# Patient Record
Sex: FEMALE | Race: BLACK OR AFRICAN AMERICAN | NOT HISPANIC OR LATINO | Employment: UNEMPLOYED | URBAN - METROPOLITAN AREA
[De-identification: names, ages, dates, MRNs, and addresses within clinical notes are randomized per-mention and may not be internally consistent; named-entity substitution may affect disease eponyms.]

---

## 2017-01-19 ENCOUNTER — HOSPITAL ENCOUNTER (OUTPATIENT)
Facility: HOSPITAL | Age: 37
Discharge: HOME | End: 2017-01-20
Attending: EMERGENCY MEDICINE | Admitting: EMERGENCY MEDICINE
Payer: MEDICAID

## 2017-01-19 DIAGNOSIS — G45.9 TRANSIENT ISCHEMIC ATTACK: ICD-10-CM

## 2017-01-19 PROBLEM — E78.5 HYPERLIPIDEMIA: Status: ACTIVE | Noted: 2017-01-19

## 2017-01-19 PROBLEM — G81.04 FLACCID HEMIPLEGIA AFFECTING LEFT NONDOMINANT SIDE: Status: ACTIVE | Noted: 2017-01-19

## 2017-01-19 LAB
ABO + RH BLD: NORMAL
ALBUMIN SERPL BCP-MCNC: 3.4 G/DL
ALP SERPL-CCNC: 56 U/L
ALT SERPL W/O P-5'-P-CCNC: 19 U/L
AMPHET+METHAMPHET UR QL: NEGATIVE
ANION GAP SERPL CALC-SCNC: 5 MMOL/L
AST SERPL-CCNC: 20 U/L
B-HCG UR QL: NEGATIVE
BARBITURATES UR QL SCN>200 NG/ML: NEGATIVE
BASOPHILS # BLD AUTO: 0.03 K/UL
BASOPHILS NFR BLD: 0.4 %
BENZODIAZ UR QL SCN>200 NG/ML: NEGATIVE
BILIRUB SERPL-MCNC: 0.3 MG/DL
BLD GP AB SCN CELLS X3 SERPL QL: NORMAL
BUN SERPL-MCNC: 8 MG/DL
BZE UR QL SCN: NEGATIVE
CALCIUM SERPL-MCNC: 8.9 MG/DL
CANNABINOIDS UR QL SCN: NEGATIVE
CHLORIDE SERPL-SCNC: 109 MMOL/L
CHOLEST/HDLC SERPL: 2.9 {RATIO}
CO2 SERPL-SCNC: 22 MMOL/L
CREAT SERPL-MCNC: 0.7 MG/DL (ref 0.5–1.4)
CREAT SERPL-MCNC: 0.8 MG/DL
CREAT UR-MCNC: 32 MG/DL
CTP QC/QA: YES
DIFFERENTIAL METHOD: NORMAL
EOSINOPHIL # BLD AUTO: 0.2 K/UL
EOSINOPHIL NFR BLD: 2.3 %
ERYTHROCYTE [DISTWIDTH] IN BLOOD BY AUTOMATED COUNT: 13.1 %
EST. GFR  (AFRICAN AMERICAN): >60 ML/MIN/1.73 M^2
EST. GFR  (NON AFRICAN AMERICAN): >60 ML/MIN/1.73 M^2
ETHANOL SERPL-MCNC: <10 MG/DL
GLUCOSE SERPL-MCNC: 80 MG/DL
GLUCOSE SERPL-MCNC: 81 MG/DL (ref 70–110)
HCG INTACT+B SERPL-ACNC: <1.2 MIU/ML
HCT VFR BLD AUTO: 38.8 %
HDL/CHOLESTEROL RATIO: 34.4 %
HDLC SERPL-MCNC: 247 MG/DL
HDLC SERPL-MCNC: 85 MG/DL
HGB BLD-MCNC: 13.4 G/DL
INR PPP: 1
LDLC SERPL CALC-MCNC: 142 MG/DL
LYMPHOCYTES # BLD AUTO: 1.6 K/UL
LYMPHOCYTES NFR BLD: 20.1 %
MCH RBC QN AUTO: 30.2 PG
MCHC RBC AUTO-ENTMCNC: 34.5 %
MCV RBC AUTO: 87 FL
METHADONE UR QL SCN>300 NG/ML: NEGATIVE
MONOCYTES # BLD AUTO: 0.5 K/UL
MONOCYTES NFR BLD: 6.6 %
NEUTROPHILS # BLD AUTO: 5.4 K/UL
NEUTROPHILS NFR BLD: 70.2 %
NONHDLC SERPL-MCNC: 162 MG/DL
OPIATES UR QL SCN: NEGATIVE
PCP UR QL SCN>25 NG/ML: NEGATIVE
PLATELET # BLD AUTO: 270 K/UL
PMV BLD AUTO: 11.5 FL
POC PTINR: 0.9 (ref 0.9–1.2)
POC PTWBT: 11.3 SEC (ref 9.7–14.3)
POCT GLUCOSE: 81 MG/DL (ref 70–110)
POTASSIUM SERPL-SCNC: 3.9 MMOL/L
PROT SERPL-MCNC: 7.3 G/DL
PROTHROMBIN TIME: 10.3 SEC
RBC # BLD AUTO: 4.44 M/UL
SAMPLE: NORMAL
SAMPLE: NORMAL
SODIUM SERPL-SCNC: 136 MMOL/L
TOXICOLOGY INFORMATION: NORMAL
TRIGL SERPL-MCNC: 100 MG/DL
TSH SERPL DL<=0.005 MIU/L-ACNC: 2.02 UIU/ML
WBC # BLD AUTO: 7.7 K/UL

## 2017-01-19 PROCEDURE — 85610 PROTHROMBIN TIME: CPT

## 2017-01-19 PROCEDURE — 99291 CRITICAL CARE FIRST HOUR: CPT | Mod: ,,, | Performed by: EMERGENCY MEDICINE

## 2017-01-19 PROCEDURE — 93010 ELECTROCARDIOGRAM REPORT: CPT | Mod: ,,, | Performed by: INTERNAL MEDICINE

## 2017-01-19 PROCEDURE — 27000221 HC OXYGEN, UP TO 24 HOURS

## 2017-01-19 PROCEDURE — 82803 BLOOD GASES ANY COMBINATION: CPT

## 2017-01-19 PROCEDURE — 84702 CHORIONIC GONADOTROPIN TEST: CPT

## 2017-01-19 PROCEDURE — 93005 ELECTROCARDIOGRAM TRACING: CPT

## 2017-01-19 PROCEDURE — 80053 COMPREHEN METABOLIC PANEL: CPT

## 2017-01-19 PROCEDURE — 82962 GLUCOSE BLOOD TEST: CPT

## 2017-01-19 PROCEDURE — 84443 ASSAY THYROID STIM HORMONE: CPT

## 2017-01-19 PROCEDURE — 99285 EMERGENCY DEPT VISIT HI MDM: CPT | Mod: 25

## 2017-01-19 PROCEDURE — 80061 LIPID PANEL: CPT

## 2017-01-19 PROCEDURE — 94761 N-INVAS EAR/PLS OXIMETRY MLT: CPT

## 2017-01-19 PROCEDURE — 82570 ASSAY OF URINE CREATININE: CPT

## 2017-01-19 PROCEDURE — 82565 ASSAY OF CREATININE: CPT

## 2017-01-19 PROCEDURE — 85025 COMPLETE CBC W/AUTO DIFF WBC: CPT

## 2017-01-19 PROCEDURE — 86850 RBC ANTIBODY SCREEN: CPT

## 2017-01-19 PROCEDURE — 81025 URINE PREGNANCY TEST: CPT | Performed by: EMERGENCY MEDICINE

## 2017-01-19 PROCEDURE — 25000003 PHARM REV CODE 250: Performed by: PHYSICIAN ASSISTANT

## 2017-01-19 PROCEDURE — G0378 HOSPITAL OBSERVATION PER HR: HCPCS

## 2017-01-19 PROCEDURE — 99223 1ST HOSP IP/OBS HIGH 75: CPT | Mod: ,,, | Performed by: PSYCHIATRY & NEUROLOGY

## 2017-01-19 PROCEDURE — 36592 COLLECT BLOOD FROM PICC: CPT

## 2017-01-19 PROCEDURE — 80320 DRUG SCREEN QUANTALCOHOLS: CPT

## 2017-01-19 PROCEDURE — 86900 BLOOD TYPING SEROLOGIC ABO: CPT

## 2017-01-19 RX ORDER — SODIUM CHLORIDE 0.9 % (FLUSH) 0.9 %
3 SYRINGE (ML) INJECTION EVERY 8 HOURS
Status: DISCONTINUED | OUTPATIENT
Start: 2017-01-19 | End: 2017-01-20 | Stop reason: HOSPADM

## 2017-01-19 RX ORDER — CAFFEINE 200 MG
400 TABLET ORAL EVERY 4 HOURS PRN
COMMUNITY
End: 2017-01-20 | Stop reason: HOSPADM

## 2017-01-19 RX ORDER — ATORVASTATIN CALCIUM 20 MG/1
40 TABLET, FILM COATED ORAL DAILY
Status: DISCONTINUED | OUTPATIENT
Start: 2017-01-19 | End: 2017-01-20 | Stop reason: HOSPADM

## 2017-01-19 RX ORDER — LABETALOL HYDROCHLORIDE 5 MG/ML
10 INJECTION, SOLUTION INTRAVENOUS
Status: DISCONTINUED | OUTPATIENT
Start: 2017-01-19 | End: 2017-01-20 | Stop reason: HOSPADM

## 2017-01-19 RX ADMIN — SODIUM CHLORIDE, PRESERVATIVE FREE 3 ML: 5 INJECTION INTRAVENOUS at 11:01

## 2017-01-19 RX ADMIN — ATORVASTATIN CALCIUM 40 MG: 20 TABLET, FILM COATED ORAL at 11:01

## 2017-01-19 NOTE — Clinical Note
Kary Magaña discharge to home/self care.    - Condition at discharge: Good  - Mode of Discharge: by walking out   - The patient left the ED accompanied by a family member.  - The discharge instructions were discussed with the patient/parent.  - They  state an understanding of the discharge instructions.  - Instructed patient/parent to go to the discharge window.

## 2017-01-19 NOTE — ED AVS SNAPSHOT
[facility]  [address] [id number]               Kary Magaña   2017  8:07 PM   ED    Description:  Female : 1980   Department:  [facility]           Your Care was Coordinated By:     Provider Role From To    John Marroquin MD Attending Provider 17 0819      Reason for Visit     Aphasia           Diagnoses this Visit        Comments    Transient ischemic attack           ED Disposition     ED Disposition Condition Comment    Observation             To Do List           Follow-up Information     Follow up with Primary Doctor No.        Follow up with Sung Barclay - Internal Medicine.    Specialty:  Internal Medicine    Contact information:    [address] [facility] [facility] [id number] [phone]    Additional information:    [facility]       These Medications        Disp Refills Start End    atorvastatin (LIPITOR) 40 MG tablet 90 tablet 3 2017    Take 1 tablet (40 mg total) by mouth once daily. - Oral    Pharmacy: [facility] - [place], [place] - [address] Ph #: [phone]       escitalopram oxalate (LEXAPRO) 10 MG tablet 30 tablet 11 2017    Take 1 tablet (10 mg total) by mouth once daily. - Oral    Pharmacy: [facility] - [place], [place] - [address] Ph #: [phone]         PURCHASE these Medications (No prescription required)        Start End    aspirin 325 MG tablet 2017    Sig - Route: Take 1 tablet (325 mg total) by mouth once daily. - Oral    Class: OTC      [facility]     [facility] -  Assistance  Registered nurses in the [facility] provide clinical advisement, health education, appointment booking, and other advisory services.  Call for this free service at [phone].              Medications           Message regarding Medications     Verify the changes and/or additions to your medication regime listed below are the same as discussed with your clinician today.  If any of these changes or additions are incorrect, please notify your healthcare provider.        START taking these NEW medications        Refills    atorvastatin (LIPITOR) 40 MG tablet 3    Sig: Take 1 tablet (40 mg total) by mouth once daily.    Class: Normal    Route: Oral    aspirin 325 MG tablet 0    Sig: Take 1 tablet (325 mg total) by mouth once daily.    Class: OTC    Route: Oral    escitalopram oxalate (LEXAPRO) 10 MG tablet 11    Sig: Take 1 tablet (10 mg total) by mouth once daily.    Class: Normal    Route: Oral      These medications were administered today        Dose Freq    sodium chloride 0.9% flush 3 mL 3 mL Every 8 hours    Sig: Inject 3 mLs into the vein every 8 (eight) hours.    Class: Normal    Route: Intravenous    Cosign for Ordering: Required by Michelle Russell MD    labetalol injection 10 mg 10 mg Every 15 min PRN    Sig: Inject 2 mLs (10 mg total) into the vein every 15 (fifteen) minutes as needed for SBP greater than or DBP greater than (200).    Class: Normal    Route: Intravenous    Cosign for Ordering: Required by Michelle Russell MD    sodium chloride 0.9% bolus 500 mL 500 mL Continuous PRN    Sig: Inject 500 mLs into the vein continuous prn (PRN SBP < 100).    Class: Normal    Route: Intravenous    Cosign for Ordering: Required by Michelle Russell MD    atorvastatin tablet 40 mg 40 mg Daily    Sig: Take 2 tablets (40 mg total) by mouth once daily.    Class: Normal    Route: Oral    Cosign for Ordering: Required by Michelle Russell MD    aspirin tablet 325 mg 325 mg Daily    Sig: Take 1 tablet (325 mg total) by mouth once daily.    Class: Normal    Route: Oral    gadobutrol 10 mL 10 mL IMG once as needed    Sig: Inject 10 mLs into the vein ONCE PRN for contrast.    Class: Normal    Route:  "Intravenous      STOP taking these medications     caffeine 200 mg Tab Take 400 mg by mouth every 4 (four) hours as needed.           Verify that the below list of medications is an accurate representation of the medications you are currently taking.  If none reported, the list may be blank. If incorrect, please contact your healthcare provider. Carry this list with you in case of emergency.           Current Medications     aspirin 325 MG tablet Take 1 tablet (325 mg total) by mouth once daily.    aspirin tablet 325 mg Take 1 tablet (325 mg total) by mouth once daily.    atorvastatin (LIPITOR) 40 MG tablet Take 1 tablet (40 mg total) by mouth once daily.    atorvastatin tablet 40 mg Take 2 tablets (40 mg total) by mouth once daily.    escitalopram oxalate (LEXAPRO) 10 MG tablet Take 1 tablet (10 mg total) by mouth once daily.    labetalol injection 10 mg Inject 2 mLs (10 mg total) into the vein every 15 (fifteen) minutes as needed for SBP greater than or DBP greater than (200).    sodium chloride 0.9% bolus 500 mL Inject 500 mLs into the vein continuous prn (PRN SBP < 100).    sodium chloride 0.9% flush 3 mL Inject 3 mLs into the vein every 8 (eight) hours.           Clinical Reference Information           Your Vitals Were     BP Pulse Temp Resp Height Weight    127/68 86 98.7 °F (37.1 °C) (Oral) 29 5' 9" (1.753 m) 59 kg (130 lb)    Last Period SpO2 BMI          12/18/2016 94% 19.2 kg/m2        Allergies as of 1/20/2017     No Known Allergies      Immunizations Administered on Date of Encounter - 1/20/2017     None      ED Micro, Lab, POCT     Start Ordered       Status Ordering Provider    01/20/17 0400 01/19/17 2136  Comprehensive metabolic panel  Early Morning Draw     Start Status   01/20/17 0400 Final result   01/21/17 0400 Scheduled       Acknowledged     01/20/17 0400 01/19/17 2136  CBC auto differential  Early Morning Draw     Start Status   01/20/17 0400 Final result   01/21/17 0400 Scheduled       " Acknowledged     01/19/17 2159 01/19/17 2159  ISTAT CREATININE  Once      Final result     01/19/17 2159 01/19/17 2159  ISTAT PROCEDURE  Once      Final result     01/19/17 2133 01/19/17 2136    Once,   Status:  Canceled     Comments:  Fasting    Canceled     01/19/17 2133 01/19/17 2136    Once,   Status:  Canceled      Canceled     01/19/17 2133 01/19/17 2136    Once,   Status:  Canceled      Canceled     01/19/17 2034 01/19/17 2034  POCT glucose  Once      Final result     01/19/17 2008 01/19/17 2007  CBC W/ AUTO DIFFERENTIAL  Once      Final result     01/19/17 2008 01/19/17 2007  Comprehensive metabolic panel  Once      Final result     01/19/17 2008 01/19/17 2007  Protime-INR  Once      Final result     01/19/17 2008 01/19/17 2007  TSH  Once      Final result     01/19/17 2008 01/19/17 2007  POCT glucose  Once      Final result     01/19/17 2008 01/19/17 2007  LDL - Lipid Panel  STAT      Final result     01/19/17 2008 01/19/17 2007  POCT urine pregnancy  Once      Final result     01/19/17 2008 01/19/17 2007  hCG, quantitative, pregnancy  STAT      Final result     01/19/17 2008 01/19/17 2007  Ethanol  STAT      Final result     01/19/17 2008 01/19/17 2007  Drug screen panel, emergency  STAT      Final result       ED Imaging Orders     Start Ordered       Status Ordering Provider    01/20/17 0600 01/20/17 0039  MRA Brain  1 time imaging      In process     01/20/17 0600 01/20/17 0039  MRA Neck  1 time imaging      In process     01/20/17 0037 01/19/17 2136  MRI Brain W WO Contrast  1 time imaging     Comments:  Patient possibly has screw in left ankle    In process     01/19/17 2136 01/19/17 2136    1 time imaging,   Status:  Canceled      Canceled     01/19/17 2135 01/19/17 2136    1 time imaging,   Status:  Canceled     Comments:  Patient possibly has screw in left ankle    Canceled     01/19/17 2135 01/19/17 2136    1 time imaging,   Status:  Canceled      Canceled     01/19/17 2008 01/19/17 2007  CT Head  Without Contrast  1 time imaging      Final result     01/19/17 2008 01/19/17 2007  X-Ray Chest AP Portable  1 time imaging      Final result        [facility] complies with applicable Federal civil rights laws and does not discriminate on the basis of race, color, national origin, age, disability, or sex.        Language Assistance Services     ATTENTION: Language assistance services are available, free of charge. Please call [phone].      ATENCIÓN: Si habla español, tiene a bergman disposición servicios gratuitos de asistencia lingüística. Llame al [phone].     CHÚ Ý: N?u b?n nói Ti?ng Vi?t, có các d?ch v? h? tr? ngôn ng? mi?n phí dành cho b?n. G?i s? [phone].

## 2017-01-20 VITALS
HEIGHT: 69 IN | HEART RATE: 86 BPM | RESPIRATION RATE: 29 BRPM | TEMPERATURE: 99 F | WEIGHT: 130 LBS | SYSTOLIC BLOOD PRESSURE: 127 MMHG | DIASTOLIC BLOOD PRESSURE: 68 MMHG | BODY MASS INDEX: 19.26 KG/M2 | OXYGEN SATURATION: 94 %

## 2017-01-20 PROBLEM — R53.1 LEFT-SIDED WEAKNESS: Status: ACTIVE | Noted: 2017-01-19

## 2017-01-20 LAB
ALBUMIN SERPL BCP-MCNC: 3.1 G/DL
ALP SERPL-CCNC: 55 U/L
ALT SERPL W/O P-5'-P-CCNC: 18 U/L
ANION GAP SERPL CALC-SCNC: 9 MMOL/L
AST SERPL-CCNC: 18 U/L
BASOPHILS # BLD AUTO: 0.02 K/UL
BASOPHILS NFR BLD: 0.2 %
BILIRUB SERPL-MCNC: 0.5 MG/DL
BUN SERPL-MCNC: 6 MG/DL
CALCIUM SERPL-MCNC: 8.8 MG/DL
CHLORIDE SERPL-SCNC: 109 MMOL/L
CO2 SERPL-SCNC: 21 MMOL/L
CREAT SERPL-MCNC: 0.8 MG/DL
DIASTOLIC DYSFUNCTION: NO
DIFFERENTIAL METHOD: ABNORMAL
EOSINOPHIL # BLD AUTO: 0.2 K/UL
EOSINOPHIL NFR BLD: 2.4 %
ERYTHROCYTE [DISTWIDTH] IN BLOOD BY AUTOMATED COUNT: 12.9 %
EST. GFR  (AFRICAN AMERICAN): >60 ML/MIN/1.73 M^2
EST. GFR  (NON AFRICAN AMERICAN): >60 ML/MIN/1.73 M^2
ESTIMATED PA SYSTOLIC PRESSURE: 14.56
GLUCOSE SERPL-MCNC: 98 MG/DL
HCT VFR BLD AUTO: 36.6 %
HGB BLD-MCNC: 13 G/DL
LYMPHOCYTES # BLD AUTO: 2.7 K/UL
LYMPHOCYTES NFR BLD: 31.7 %
MCH RBC QN AUTO: 30.6 PG
MCHC RBC AUTO-ENTMCNC: 35.5 %
MCV RBC AUTO: 86 FL
MONOCYTES # BLD AUTO: 0.6 K/UL
MONOCYTES NFR BLD: 6.6 %
NEUTROPHILS # BLD AUTO: 5 K/UL
NEUTROPHILS NFR BLD: 58.4 %
PLATELET # BLD AUTO: 290 K/UL
PMV BLD AUTO: 11 FL
POTASSIUM SERPL-SCNC: 3.5 MMOL/L
PROT SERPL-MCNC: 6.8 G/DL
RBC # BLD AUTO: 4.25 M/UL
RETIRED EF AND QEF - SEE NOTES: 60 (ref 55–65)
SODIUM SERPL-SCNC: 139 MMOL/L
WBC # BLD AUTO: 8.58 K/UL

## 2017-01-20 PROCEDURE — G8980 MOBILITY D/C STATUS: HCPCS | Mod: CK

## 2017-01-20 PROCEDURE — G8978 MOBILITY CURRENT STATUS: HCPCS | Mod: CK

## 2017-01-20 PROCEDURE — 93306 TTE W/DOPPLER COMPLETE: CPT

## 2017-01-20 PROCEDURE — 93306 TTE W/DOPPLER COMPLETE: CPT | Mod: 26,,, | Performed by: INTERNAL MEDICINE

## 2017-01-20 PROCEDURE — G8996 SWALLOW CURRENT STATUS: HCPCS | Mod: CH

## 2017-01-20 PROCEDURE — G8989 SELF CARE D/C STATUS: HCPCS | Mod: CJ

## 2017-01-20 PROCEDURE — 25500020 PHARM REV CODE 255: Performed by: PSYCHIATRY & NEUROLOGY

## 2017-01-20 PROCEDURE — G0378 HOSPITAL OBSERVATION PER HR: HCPCS

## 2017-01-20 PROCEDURE — 97165 OT EVAL LOW COMPLEX 30 MIN: CPT | Mod: 59

## 2017-01-20 PROCEDURE — 80053 COMPREHEN METABOLIC PANEL: CPT

## 2017-01-20 PROCEDURE — 25000003 PHARM REV CODE 250: Performed by: STUDENT IN AN ORGANIZED HEALTH CARE EDUCATION/TRAINING PROGRAM

## 2017-01-20 PROCEDURE — G8979 MOBILITY GOAL STATUS: HCPCS | Mod: CK

## 2017-01-20 PROCEDURE — 99233 SBSQ HOSP IP/OBS HIGH 50: CPT | Mod: ,,, | Performed by: PSYCHIATRY & NEUROLOGY

## 2017-01-20 PROCEDURE — 92610 EVALUATE SWALLOWING FUNCTION: CPT

## 2017-01-20 PROCEDURE — 97161 PT EVAL LOW COMPLEX 20 MIN: CPT

## 2017-01-20 PROCEDURE — G8988 SELF CARE GOAL STATUS: HCPCS | Mod: CI

## 2017-01-20 PROCEDURE — 25000003 PHARM REV CODE 250: Performed by: PHYSICIAN ASSISTANT

## 2017-01-20 PROCEDURE — A9585 GADOBUTROL INJECTION: HCPCS | Performed by: PSYCHIATRY & NEUROLOGY

## 2017-01-20 PROCEDURE — G8997 SWALLOW GOAL STATUS: HCPCS | Mod: CH

## 2017-01-20 PROCEDURE — G8987 SELF CARE CURRENT STATUS: HCPCS | Mod: CJ

## 2017-01-20 PROCEDURE — 85025 COMPLETE CBC W/AUTO DIFF WBC: CPT

## 2017-01-20 RX ORDER — ASPIRIN 325 MG
325 TABLET ORAL DAILY
Refills: 0 | COMMUNITY
Start: 2017-01-20 | End: 2017-09-04

## 2017-01-20 RX ORDER — ATORVASTATIN CALCIUM 40 MG/1
40 TABLET, FILM COATED ORAL DAILY
Qty: 90 TABLET | Refills: 3 | Status: SHIPPED | OUTPATIENT
Start: 2017-01-20 | End: 2017-09-04

## 2017-01-20 RX ORDER — ASPIRIN 325 MG
325 TABLET ORAL DAILY
Status: DISCONTINUED | OUTPATIENT
Start: 2017-01-20 | End: 2017-01-20 | Stop reason: HOSPADM

## 2017-01-20 RX ORDER — GADOBUTROL 604.72 MG/ML
10 INJECTION INTRAVENOUS
Status: COMPLETED | OUTPATIENT
Start: 2017-01-20 | End: 2017-01-20

## 2017-01-20 RX ORDER — ESCITALOPRAM OXALATE 10 MG/1
10 TABLET ORAL DAILY
Qty: 30 TABLET | Refills: 11 | Status: SHIPPED | OUTPATIENT
Start: 2017-01-20 | End: 2017-11-13 | Stop reason: SDUPTHER

## 2017-01-20 RX ADMIN — GADOBUTROL 10 ML: 604.72 INJECTION INTRAVENOUS at 11:01

## 2017-01-20 RX ADMIN — ASPIRIN 325 MG ORAL TABLET 325 MG: 325 PILL ORAL at 09:01

## 2017-01-20 RX ADMIN — ATORVASTATIN CALCIUM 40 MG: 20 TABLET, FILM COATED ORAL at 09:01

## 2017-01-20 NOTE — ASSESSMENT & PLAN NOTE
Admit to observation for TIA work up, ABCD2 score: 4    Atorvastatin 40mg qhs started, will start asa once scan is complete and no hemorrhage    MRI Brain, MRA head/neck, echo HgA1C pending    OT/ST/PT ordered    Educated patient about OCP and increased risk of stroke

## 2017-01-20 NOTE — PT/OT/SLP EVAL
Physical Therapy  Evaluation    Kary Magaña   MRN: [id number]   Admitting Diagnosis: Left-sided weakness    PT Received On: 17  PT Start Time: 834     PT Stop Time: 905    PT Total Time (min): 31 min       Billable Minutes:  Evaluation 31 min    Diagnosis: Left-sided weakness      Past Medical History   Diagnosis Date    Sickle cell trait       History reviewed. No pertinent past surgical history.    Referring physician: Michelle Russell MD  Date referred to PT: 17    General Precautions: Standard, aspiration, fall  Orthopedic Precautions: N/A   Braces: N/A       Patient History:  Lives With: child(rachell), dependent (7 children ranging from 2-17 years old)  Living Arrangements: house  Home Accessibility: stairs to enter home, stairs within home  Home Layout: Bedroom on 2nd floor  Number of Stairs to Enter Home: 1  Number of Stairs Within Home: 7  Stair Railings at Home: inside, present at both sides  Living Environment Comment:  (Pt does not work, spends all of her time caring for her 7 children.)  Equipment Currently Used at Home: none  DME owned (not currently used): none    Previous Level of Function:  Ambulation Skills: independent  Transfer Skills: independent  ADL Skills: independent  Work/Leisure Activity: independent    Subjective:  Communicated with RN prior to session.  Pt mother present during session.  Pt reports that she is feeling better, but she is not back to normal  Chief Complaint: L sided weakness, heaviness  Patient goals: To get back home to take care of her children.    Pain Ratin/10   Pain Rating Post-Intervention: 0/10    Objective:   Patient found with: blood pressure cuff, peripheral IV, telemetry     Cognitive Exam:  Oriented to: Person, Place, Time and Situation    Follows Commands/attention: Follows multistep  commands  Communication: clear/fluent  Safety awareness/insight to disability: intact    Physical Exam:  Postural examination/scapula alignment: No postural  abnormalities identified    Skin integrity: Visible skin intact  Edema: None noted     Sensation:   Impaired light/touch L UE    Lower Extremity Range of Motion:  Right Lower Extremity: WFL  Left Lower Extremity: WFL    Lower Extremity Strength:  Right Lower Extremity: WFL  Left Lower Extremity: Deficits: grossly 4/5, knee flexion 4+/5     Fine motor coordination:  Impaired : Left hand, finger to nose -Past pointing and increased difficulty, LLE heel shin -Increased difficulty    Functional Mobility:  Bed Mobility:  Rolling/Turning Right: Modified independent  Scooting/Bridging: Modified Independent  Supine to Sit: Modified Independent (Pt sat up into long sitting and then swung her legs over R side of stretcher.)  Sit to Supine: Supervision (Pt required VC to make sure she was safely sitting on stretcher before attempting to lay down.)    Transfers:  Sit <> Stand Assistance: Contact Guard Assistance  Sit <> Stand Assistive Device: No Assistive Device    Gait:   Gait Distance: 50 ft  Assistance 1: Contact Guard Assistance, Minimum assistance  Gait Assistive Device: Hand held assist (L hand )  Gait Pattern: partial weightbearing, step-to  Gait Deviation(s): decreased weight-shifting ability, decreased R step length, L lateral lean, lacking L terminal knee extension    Tinetti Gait and Balance Assessment     Assistive Device  Normally Used: No  Assistive Device During Testing: Yes (HHA)      Balance Tests:  1. Sitting Balance:          Steady; safe = 1  2. Arises :        Able, uses arms to help = 1  3. Attempts to arise :        Able to arise, 1 attempt = 2  4. Immediate standing Balance :        Unsteady (swaggers, moves feet, trunk sway) = 0  5. Standing balance:         Unsteady = 0  6. Nudged :        Did not attempt for safety = 0  7. Eyes closed:         Unsteady = 0  8. Turning 360 degrees:         Did not attempt for safety = 0  9.Sitting Down :         Uses arms or not a smooth motion = 1       Balance Score   5/16    Gait Tests:  10. Initiation of Gait:         Any hesitancy or multiple attempts to start = 0  11. Step length and height :        Right swing foot:  Does not pass left stance foot with step = 0 and Does not clear foot completely with step = 0 and Left swing foot:  Passes right stance foot = 0 and Completely clears floor with step = 1  12. Step symmetry         Right & Left step length not equal (estimate) = 0  13. Step continuity :        Stooping or discontinuity between steps = 0  14. Path :        Mild/moderate deviation or uses walking aid = 1  15. Trunk :          No sway, but flexion of knees or back or spreads arm out while walking = 1  16. Walking stance width          Heels apart = 0       Gait Score:    3/12     Balance + Gait Score: 8 /28       25-28= Low fall risk  19-24 Medium fall risk  < 19= High fall risk    AM-PAC 6 CLICK MOBILITY  How much help from another person does this patient currently need?   1 = Unable, Total/Dependent Assistance  2 = A lot, Maximum/Moderate Assistance  3 = A little, Minimum/Contact Guard/Supervision  4 = None, Modified [place]/Independent    Turning over in bed (including adjusting bedclothes, sheets and blankets)?: 3  Sitting down on and standing up from a chair with arms (e.g., wheelchair, bedside commode, etc.): 3  Moving from lying on back to sitting on the side of the bed?: 3  Moving to and from a bed to a chair (including a wheelchair)?: 3  Need to walk in hospital room?: 3  Climbing 3-5 steps with a railing?: 3  Total Score: 18     AM-PAC Raw Score CMS G-Code Modifier Level of Impairment Assistance   6 % Total / Unable   7 - 9 CM 80 - 100% Maximal Assist   10 - 14 CL 60 - 80% Moderate Assist   15 - 19 CK 40 - 60% Moderate Assist   20 - 22 CJ 20 - 40% Minimal Assist   23 CI 1-20% SBA / CGA   24 CH 0% Independent/ Mod I     Patient left HOB elevated with all lines intact, call button in reach and mother and speech present.    Assessment:    Kary Magaña is a 36 y.o. female with a medical diagnosis of Left-sided weakness and presents with impaired strength, impaired sensation, impaired endurance, decreased balance, and impaired coordination.  Ms. Magaña exhibits slight strength and sensation deficits with her L LE and she is able to complete bed mobility tasks without any physical assistance.  Upon standing and during ambulation, Ms. Magaña exhibits severe balance and coordination deficits (Tinetti Balance Assessment Score: 8/28) and is at risk of falls. She would benefit from skilled PT services for strengthening, endurance training, and NMR/balance training in order to improve her independence and safety with mobility and allow her to return home to care for her 7 children.     Rehab identified problem list/impairments: Rehab identified problem list/impairments: weakness, impaired endurance, impaired sensation, gait instability, impaired functional mobilty, impaired balance, impaired coordination, decreased lower extremity function    Rehab potential is good.    Activity tolerance: Good    Discharge recommendations: Discharge Facility/Level Of Care Needs: rehabilitation facility     Barriers to discharge: Barriers to Discharge: Inaccessible home environment, Decreased caregiver support    Equipment recommendations: Equipment Needed After Discharge: shower chair, cane, straight, walker, rolling     GOALS:   Physical Therapy Goals     Not on file      Multidisciplinary Problems (Resolved)        Problem: Physical Therapy Goal    Goal Priority Disciplines Outcome Goal Variances Interventions   Physical Therapy Goal   (Resolved)     PT/OT, PT Outcome(s) achieved     Description:  Goals to be met by: 2017    Patient will increase functional independence with mobility by performin. Sit to stand transfer with Standby Assist with Rolling Walker  2. Gait  x 150 feet with Stand-by Assistance using Rolling Walker.   3. Ascend/descend 7 stair with  bilateral Handrails Stand-by Assistance.  4. Stand for 10 minutes with Contact Guard Assistance with moderate challenges to balance with no AD.  5. Lower extremity exercise program x15 reps per handout, with supervision.                PLAN:    Patient to be seen 6 x/week to address the above listed problems via gait training, therapeutic activities, therapeutic exercises, neuromuscular re-education  Plan of Care expires: 02/20/17  Plan of Care reviewed with: patient, mother          Rodri Beyerwhitneytri, SPT  01/20/2017

## 2017-01-20 NOTE — PROGRESS NOTES
[facility]  Vascular Neurology  Comprehensive Stroke Center  Progress Note      Neurologic Chief Complaint: L sided weakness    Subjective:     Interval History: Patient is seen for follow-up neurological assessment and treatment recommendations: Continued, subjective L sided weakness and numbness.  MRI pending.    HPI, Past Medical, Family, and Social History remains the same as documented in the initial encounter.     Review of Systems   Constitutional: Negative for chills and fever.   HENT: Negative for drooling and rhinorrhea.    Eyes: Negative for discharge and visual disturbance.   Respiratory: Negative for cough and shortness of breath.    Cardiovascular: Negative for chest pain and palpitations.   Gastrointestinal: Negative for diarrhea and vomiting.   Genitourinary: Negative for dysuria and urgency.   Musculoskeletal: Negative for arthralgias and back pain.   Skin: Negative for pallor and rash.   Neurological: Positive for weakness and numbness.   Hematological: Does not bruise/bleed easily.   Psychiatric/Behavioral: Negative for agitation.     Scheduled Meds:   aspirin  325 mg Oral Daily    atorvastatin  40 mg Oral Daily    sodium chloride 0.9%  3 mL Intravenous Q8H     Continuous Infusions:   sodium chloride 0.9%       PRN Meds:labetalol, sodium chloride 0.9%    Objective:     Vital Signs (Most Recent):  Temp: 98.7 °F (37.1 °C) (01/19/17 2001)  Pulse: 86 (01/20/17 0959)  Resp: (!) 29 (01/20/17 0959)  BP: 127/68 (01/20/17 0959)  SpO2: (!) 94 % (01/20/17 0959)       Vital Signs Range (Last 24H):  Temp:  [98.7 °F (37.1 °C)]   Pulse:  []   Resp:  [18-29]   BP: ()/(55-94)   SpO2:  [92 %-100 %]        Physical Exam   Constitutional: She is oriented to person, place, and time. She appears well-developed and well-nourished. No distress.   HENT:   Head: Atraumatic.   Eyes: EOM are normal.   Neck: Normal range of motion.   Cardiovascular: Normal rate.    Pulmonary/Chest: Effort  normal.   Musculoskeletal: Normal range of motion.   Neurological: She is alert and oriented to person, place, and time.   Skin: Skin is warm and dry. She is not diaphoretic.   Psychiatric: She has a normal mood and affect.   Nursing note and vitals reviewed.      Neurological Exam:   LOC: alert and follows requests  Language: No aphasia  Speech: No dysarthria  Orientation: Person, Place, Time  Memory: Recent memory intact, Remote memory intact, Age correct, Month correct  Visual Fields (CN II): Full  EOM (CN III, IV, VI): Full/intact  Facial Movement (CN VII): symmetric facial expressiond  Tongue (CN XII): midline   Motor*: Arm Left: Paretic: 4+/5, Leg Left: Paretic: 4+/5, Arm Right: Normal (5/5), Leg Right: Normal (5/5)  Cerebellar: normal finger to nose bilaterally.  Mild ataxia of LLE with heel to shin  Sensation: decreased on L  Tone: Arm-Left: normal; Leg-Left: normal; Arm-Right: normal; Leg-Right: normal    NIH Stroke Scale:  Interval: 24 hours post onset of symptoms +/- 20 mins  Level of Consciousness: 0 - alert  LOC Questions: 0 - answers both correctly  LOC Commands: 0 - performs both correctly  Best Gaze: 0 - normal  Visual: 0 - no visual loss  Facial Palsy: 0 - normal  Motor Left Arm: 0 - no drift  Motor Right Arm: 0 - no drift  Motor Left Le - no drift  Motor Right Le - no drift  Limb Ataxia: 1 - present in one limb  Sensory: 1 - mild to moderate loss  Best Language: 0 - no aphasia  Dysarthria: 0 - normal articulation  Extinction and Inattention: 0 - no neglect  NIH Stroke Scale Total: 2      Laboratory:  CMP:   Recent Labs  Lab 17   CALCIUM 8.8   ALBUMIN 3.1*   PROT 6.8      K 3.5   CO2 21*      BUN 6   CREATININE 0.8   ALKPHOS 55   ALT 18   AST 18   BILITOT 0.5     BMP:   Recent Labs  Lab 17      K 3.5      CO2 21*   BUN 6   CREATININE 0.8   CALCIUM 8.8     CBC:   Recent Labs  Lab 17   WBC 8.58   RBC 4.25   HGB 13.0   HCT 36.6*   PLT  290   MCV 86   MCH 30.6   MCHC 35.5     Lipid Panel:   Recent Labs  Lab 01/19/17 2029   CHOL 247*   LDLCALC 142.0   HDL 85*   TRIG 100     Coagulation:   Recent Labs  Lab 01/19/17 2029   INR 1.0     Hgb A1C: No results for input(s): HGBA1C in the last 168 hours.  TSH:   Recent Labs  Lab 01/19/17 2029   TSH 2.020       Diagnostic Results:  I have personally reviewed: CT Head. Date: 12/19/17  Findings: no acute abnormalities    MRI/A pending    Assessment/Plan:     Patient is a 36 year old female with no known PMH who presented to the ED with resolving left sided weakness.  Mild decrease in strength on left side when comparing to right.      Left-sided weakness  ASA 325mg daily  Atorvastatin 40mg daily    MRI Brain, MRA head/neck, TTE, HgA1C pending    OT/ST/PT ordered    Educated patient about OCP and increased risk of stroke.  Recommend decreasing/stopping caffeine use.    Flaccid hemiplegia affecting left nondominant side  PT/OT    Hyperlipidemia  Risk factor for stroke    Atorvastatin 40mg qhs       Roxann Leung MD  Comprehensive Stroke Center  Department of Vascular Neurology   [facility]

## 2017-01-20 NOTE — PLAN OF CARE
Problem: Occupational Therapy Goal  Goal: Occupational Therapy Goal  Goals not set 2* patient scheduled to d/c home.  OT evaluation completed.  Recommended outpt OT to address left sided weakness.  DALY Olivarez  1/20/2017

## 2017-01-20 NOTE — H&P
[facility]  Vascular Neurology  [facility] St[facility] Center  History & Physical    Subjective:     History of Present Illness:  Patient is a 36 year old female with no known PMH who presented to the ED with resolving left sided weakness.  Patient said she experienced SOB and noticed her face was crooked.  She has slurred speech and her was unable to move left arm.  She didn't have a headache at the time and when arrived to ED she is almost back to baseline.  She denies history of tobacco abuse, alcohol use, drug use, miscarriages and clotting disorders.      NIH stroke scale: 0            Past Medical History   Diagnosis Date    Sickle cell trait      History reviewed. No pertinent past surgical history.  History reviewed. No pertinent family history.  Social History   Substance Use Topics    Smoking status: Never Smoker    Smokeless tobacco: None    Alcohol use Yes      Comment: socially     Review of patient's allergies indicates:  No Known Allergies  Medications: I have reviewed the current medication administration record.      (Not in a hospital admission)    Review of Systems   Constitutional: Negative for chills and fever.   HENT: Negative for drooling and rhinorrhea.    Eyes: Negative for discharge and visual disturbance.   Respiratory: Negative for cough and shortness of breath.    Cardiovascular: Negative for chest pain and palpitations.   Gastrointestinal: Negative for diarrhea and vomiting.   Genitourinary: Negative for dysuria and urgency.   Musculoskeletal: Negative for arthralgias and back pain.   Skin: Negative for pallor and rash.   Neurological: Negative for dizziness and facial asymmetry.   Hematological: Does not bruise/bleed easily.   Psychiatric/Behavioral: Negative for agitation.     Objective:     Vital Signs (Most Recent):  Temp: 98.7 °F (37.1 °C) (01/19/17 2001)  Pulse: 104 (01/19/17 2102)  Resp: 20 (01/19/17 2102)  BP: 138/86 (01/19/17 2102)  SpO2: 100 % (01/19/17  2)    Vital Signs Range (Last 24H):  Temp:  [98.7 °F (37.1 °C)]   Pulse:  [101-107]   Resp:  [18-20]   BP: (138-158)/(84-94)   SpO2:  [100 %]     Physical Exam   Constitutional: She appears well-developed.   HENT:   Head: Normocephalic and atraumatic.   Eyes: EOM are normal. Pupils are equal, round, and reactive to light.   Neck: Neck supple.   Cardiovascular:   tachycardic   Pulmonary/Chest: Effort normal. No respiratory distress.   Abdominal:   Not examined    Genitourinary:   Genitourinary Comments: Not examined   Musculoskeletal: Normal range of motion.   Neurological:   See below     Skin: No rash noted. No erythema.   Psychiatric: She has a normal mood and affect.       Neurological Exam:   LOC: alert and follows requests  Language: No aphasia  Speech: No dysarthria  Orientation: Person, Place, Time  Memory: Recent memory intact, Remote memory intact, Age correct, Month correct  Visual Fields (CN II): Full  EOM (CN III, IV, VI): Full/intact  Oculocephalics: not examined  Pupils (CN III, IV, VI): PERRL  Facial Sensation (CN V): Symmetric  Facial Movement (CN VII): symmetric facial expression  Hearing (CN VIII): not done  Gag Reflex (CN IX, X): not done  Shoulder/Neck (CN XI): not exmained  Tongue (CN XII): midline   Reflexes: not examined   Motor*: Arm Left:  Paretic:  4/5, Leg Left:   Paretic:  4/5, Arm Right:   Normal (5/5), Leg Right:   Normal (5/5)  Cerebellar*: normal finger to nose  Sensation:intact   Tone: Arm-Left: normal; Leg-Left: normal; Arm-Right: normal; Leg-Right: normal    NIH Stroke Scale:  Interval: baseline (upon arrival/admit)  Level of Consciousness: 0 - alert  LOC Questions: 0 - answers both correctly  LOC Commands: 0 - performs both correctly  Best Gaze: 0 - normal  Visual: 0 - no visual loss  Facial Palsy: 0 - normal  Motor Left Arm: 0 - no drift  Motor Right Arm: 0 - no drift  Motor Left Le - no drift  Motor Right Le - no drift  Limb Ataxia: 0 - absent  Sensory: 0 -  normal  Best Language: 0 - no aphasia  Dysarthria: 0 - normal articulation  Extinction and Inattention: 0 - no neglect  NIH Stroke Scale Total: 0  Modified [place] Scale:   Timeline: Prior to symptoms onset  Modified [place] Score: 0 - no symptoms    ABCD2 Scale for TIA:   Age > or = 60: 0 - no  B/P or = 140/9 at Initial Evaluation: 1 - yes  Clinical Features of TIA: 2 - unilateral weakness  Duration of Symptoms: 1 - 10-59 minutes  Diabetes Mellitus in History: 0 - no  ABCD2 Scale Total: 4      Laboratory:  CMP:   Recent Labs  Lab 01/19/17 2029   CALCIUM 8.9   ALBUMIN 3.4*   PROT 7.3      K 3.9   CO2 22*      BUN 8   CREATININE 0.8   ALKPHOS 56   ALT 19   AST 20   BILITOT 0.3     CBC:   Recent Labs  Lab 01/19/17 2029   WBC 7.70   RBC 4.44   HGB 13.4   HCT 38.8      MCV 87   MCH 30.2   MCHC 34.5     Lipid Panel:   Recent Labs  Lab 01/19/17 2029   CHOL 247*   LDLCALC 142.0   HDL 85*   TRIG 100     Hgb A1C: No results for input(s): HGBA1C in the last 168 hours.  TSH:   Recent Labs  Lab 01/19/17 2029   TSH 2.020       Diagnostic Results:  Brain Imaging:  No studies to review     Assessment/Plan:   Patient is a 36 year old female with no known PMH who presented to the ED with resolving left sided weakness.      Transient ischemic attack  Admit to observation for TIA work up, ABCD2 score: 4    Atorvastatin 40mg qhs started, will start asa once scan is complete and no hemorrhage    MRI Brain, MRA head/neck, echo HgA1C pending    OT/ST/PT ordered    Educated patient about OCP and increased risk of stroke        Flaccid hemiplegia affecting left nondominant side  PT/OT ordered     Hyperlipidemia  Risk factor for stroke    Started atorvastatin 40mg qhs       Thrombolysis Candidate? Interventional Revascularization Candidate?  No symptoms resolving     Research Candidate? no    Amalia Briseno PA-C  [facility]  Department of Vascular Neurology   [facility]

## 2017-01-20 NOTE — SUBJECTIVE & OBJECTIVE
Neurologic Chief Complaint: L sided weakness    Subjective:     Interval History: Patient is seen for follow-up neurological assessment and treatment recommendations: Continued, subjective L sided weakness and numbness.  MRI pending.    HPI, Past Medical, Family, and Social History remains the same as documented in the initial encounter.     Review of Systems   Constitutional: Negative for chills and fever.   HENT: Negative for drooling and rhinorrhea.    Eyes: Negative for discharge and visual disturbance.   Respiratory: Negative for cough and shortness of breath.    Cardiovascular: Negative for chest pain and palpitations.   Gastrointestinal: Negative for diarrhea and vomiting.   Genitourinary: Negative for dysuria and urgency.   Musculoskeletal: Negative for arthralgias and back pain.   Skin: Negative for pallor and rash.   Neurological: Positive for weakness and numbness.   Hematological: Does not bruise/bleed easily.   Psychiatric/Behavioral: Negative for agitation.     Scheduled Meds:   aspirin  325 mg Oral Daily    atorvastatin  40 mg Oral Daily    sodium chloride 0.9%  3 mL Intravenous Q8H     Continuous Infusions:   sodium chloride 0.9%       PRN Meds:labetalol, sodium chloride 0.9%    Objective:     Vital Signs (Most Recent):  Temp: 98.7 °F (37.1 °C) (01/19/17 2001)  Pulse: 86 (01/20/17 0959)  Resp: (!) 29 (01/20/17 0959)  BP: 127/68 (01/20/17 0959)  SpO2: (!) 94 % (01/20/17 0959)       Vital Signs Range (Last 24H):  Temp:  [98.7 °F (37.1 °C)]   Pulse:  []   Resp:  [18-29]   BP: ()/(55-94)   SpO2:  [92 %-100 %]        Physical Exam   Constitutional: She is oriented to person, place, and time. She appears well-developed and well-nourished. No distress.   HENT:   Head: Atraumatic.   Eyes: EOM are normal.   Neck: Normal range of motion.   Cardiovascular: Normal rate.    Pulmonary/Chest: Effort normal.   Musculoskeletal: Normal range of motion.   Neurological: She is alert and oriented to  person, place, and time.   Skin: Skin is warm and dry. She is not diaphoretic.   Psychiatric: She has a normal mood and affect.   Nursing note and vitals reviewed.      Neurological Exam:   LOC: alert and follows requests  Language: No aphasia  Speech: No dysarthria  Orientation: Person, Place, Time  Memory: Recent memory intact, Remote memory intact, Age correct, Month correct  Visual Fields (CN II): Full  EOM (CN III, IV, VI): Full/intact  Facial Movement (CN VII): symmetric facial expressiond  Tongue (CN XII): midline   Motor*: Arm Left: Paretic: 4+/5, Leg Left: Paretic: 4+/5, Arm Right: Normal (5/5), Leg Right: Normal (5/5)  Cerebellar: normal finger to nose bilaterally.  Mild ataxia of LLE with heel to shin  Sensation: decreased on L  Tone: Arm-Left: normal; Leg-Left: normal; Arm-Right: normal; Leg-Right: normal    NIH Stroke Scale:  Interval: 24 hours post onset of symptoms +/- 20 mins  Level of Consciousness: 0 - alert  LOC Questions: 0 - answers both correctly  LOC Commands: 0 - performs both correctly  Best Gaze: 0 - normal  Visual: 0 - no visual loss  Facial Palsy: 0 - normal  Motor Left Arm: 0 - no drift  Motor Right Arm: 0 - no drift  Motor Left Le - no drift  Motor Right Le - no drift  Limb Ataxia: 1 - present in one limb  Sensory: 1 - mild to moderate loss  Best Language: 0 - no aphasia  Dysarthria: 0 - normal articulation  Extinction and Inattention: 0 - no neglect  NIH Stroke Scale Total: 2      Laboratory:  CMP:   Recent Labs  Lab 17   CALCIUM 8.8   ALBUMIN 3.1*   PROT 6.8      K 3.5   CO2 21*      BUN 6   CREATININE 0.8   ALKPHOS 55   ALT 18   AST 18   BILITOT 0.5     BMP:   Recent Labs  Lab 17      K 3.5      CO2 21*   BUN 6   CREATININE 0.8   CALCIUM 8.8     CBC:   Recent Labs  Lab 17   WBC 8.58   RBC 4.25   HGB 13.0   HCT 36.6*      MCV 86   MCH 30.6   MCHC 35.5     Lipid Panel:   Recent Labs  Lab 17   CHOL  247*   LDLCALC 142.0   HDL 85*   TRIG 100     Coagulation:   Recent Labs  Lab 01/19/17 2029   INR 1.0     Hgb A1C: No results for input(s): HGBA1C in the last 168 hours.  TSH:   Recent Labs  Lab 01/19/17 2029   TSH 2.020       Diagnostic Results:  I have personally reviewed: CT Head. Date: 12/19/17  Findings: no acute abnormalities    MRI/A pending

## 2017-01-20 NOTE — PLAN OF CARE
Problem: Physical Therapy Goal  Goal: Physical Therapy Goal  Goals to be met by: 2017    Patient will increase functional independence with mobility by performin. Sit to stand transfer with Standby Assist with Rolling Walker  2. Gait x 150 feet with Stand-by Assistance using Rolling Walker.   3. Ascend/descend 7 stair with bilateral Handrails Stand-by Assistance.  4. Stand for 10 minutes with Contact Guard Assistance with moderate challenges to balance with no AD.  5. Lower extremity exercise program x15 reps per handout, with supervision.   Outcome: Outcome(s) achieved Date Met:  17  No goals met secondary to pt D/C home.     Rodri Finney, SPT  2017

## 2017-01-20 NOTE — PLAN OF CARE
Problem: SLP Goal  Goal: SLP Goal  Outcome: Ongoing (interventions implemented as appropriate)  Regular diet with thin liquids recommended.    Apple Stephen MA/MALACHI-SLP  Speech Language Pathologist  Pager [phone]  1/20/2017

## 2017-01-20 NOTE — PT/OT/SLP EVAL
"Speech Language Pathology  Evaluation    Kary Magaña   MRN: [id number]   Admitting Diagnosis: <principal problem not specified>    Diet recommendations: Solid Diet Level: Regular  Liquid Diet Level: Thin     SLP Treatment Date: 17  Speech Start Time: 855     Speech Stop Time: 905     Speech Total (min): 10 min       TREATMENT BILLABLE MINUTES:  Eval Swallow and Oral Function 10    Diagnosis: <principal problem not specified>      Past Medical History   Diagnosis Date    Sickle cell trait      History reviewed. No pertinent past surgical history.    Has the patient been evaluated by SLP for swallowing? : Yes  Keep patient NPO?: No   General Precautions: Standard,              Prior diet: Regular diet with thin  Subjective:  "I am hungry"    Pain Ratin/10     Pain Rating Post-Intervention: 0/10    Objective:        Oral Musculature Evaluation  Oral Musculature: WNL  Dentition: present and adequate  Mucosal Quality: good  Mandibular Strength and Mobility: WNL  Oral Labial Strength and Mobility: WNL  Lingual Strength and Mobility: WNL  Velar Elevation: WNL  Buccal Strength and Mobility: WNL  Volitional Cough: strong  Volitional Swallow: no delay  Voice Prior to PO Intake: wnl     Bedside Swallow Eval:  Consistencies Assessed: Thin liquids 1 cup, Puree 2 teaspoons and Solids 1/2 cracker  Oral Phase: WFL  Pharyngeal Phase: no overt clinical  signs/symptoms of aspiration and no overt clinical signs/symptoms of pharyngeal dysphagia    Additional Treatment:      FIM:                                 Assessment:  Kary Magaña is a 36 y.o. female with a medical diagnosis of <principal problem not specified> and presents with oral and pharyngeal phases of swallow wfl.    Do you have any cultural, spiritual, Scientologist conflicts, given your current situation?: no     Discharge recommendations: Discharge Facility/Level Of Care Needs: home     Goals:   SLP Goals        Problem: SLP Goal    Goal Priority Disciplines " Outcome   SLP Goal     SLP Ongoing (interventions implemented as appropriate)   Description:  Pt. Will participate in speech language eval               Plan:   Patient to be seen Therapy Frequency: 5 x/week   Plan of Care expires: 02/18/17  Plan of Care reviewed with: patient  SLP Follow-up?: Yes  SLP - Next Visit Date: 01/23/17           Apple Stephen MA, CCC-SLP  01/20/2017

## 2017-01-20 NOTE — ASSESSMENT & PLAN NOTE
ASA 325mg daily  Atorvastatin 40mg daily    MRI Brain, MRA head/neck, TTE, HgA1C pending    OT/ST/PT ordered    Educated patient about OCP and increased risk of stroke.  Recommend decreasing/stopping caffeine use.

## 2017-01-20 NOTE — ED PROVIDER NOTES
Encounter Date: 1/19/2017    SCRIBE #1 NOTE: I, Marcia Ferrari , am scribing for, and in the presence of,  Dr. Marroquin . I have scribed the entire note.       History     Chief Complaint   Patient presents with    Aphasia     Pt presents to the ED via EMS from home. EMS reports sudden onset of left sided weakness, slurred speech and facial droop that began around 6:30pm.      Review of patient's allergies indicates:  No Known Allergies  HPI Comments: Time seen by provider: 8:00 PM    This is a 36 y.o. female with no pertinent medical hx who presents to the ED via EMS from home. EMS reports sudden onset of left sided weakness, slurred speech, and slight left sided facial droop that began around 6:30 PM. EMS states that the slurred speech is the most prominent. Per EMS, patient denies headache, nausea, vomiting, and diarrhea.     The history is provided by the EMS personnel and medical records. The history is limited by the condition of the patient.     Past Medical History   Diagnosis Date    Sickle cell trait      Past Medical History Pertinent Negatives   Diagnosis Date Noted    Anticoagulant long-term use 1/19/2017    Arthritis 1/19/2017     History reviewed. No pertinent past surgical history.  History reviewed. No pertinent family history.  Social History   Substance Use Topics    Smoking status: Never Smoker    Smokeless tobacco: None    Alcohol use Yes      Comment: socially     Review of Systems   Unable to perform ROS: Acuity of condition       Physical Exam   Initial Vitals   BP Pulse Resp Temp SpO2   01/19/17 2001 01/19/17 2001 01/19/17 2001 01/19/17 2001 01/19/17 2001   158/94 107 18 98.7 °F (37.1 °C) 100 %     Physical Exam    Nursing note and vitals reviewed.  Constitutional: She appears well-developed and well-nourished. She is not diaphoretic. No distress.   HENT:   Head: Normocephalic and atraumatic.   Mouth/Throat: Oropharynx is clear and moist.   In her PMHx, no headaches    Eyes: Conjunctivae  and EOM are normal. Pupils are equal, round, and reactive to light.   Neck: Normal range of motion. Neck supple. No JVD present.   Cardiovascular: Normal rate, regular rhythm and normal heart sounds.   No murmur heard.  Pulmonary/Chest: Breath sounds normal. No respiratory distress. She has no wheezes. She has no rhonchi. She has no rales.   Abdominal: Soft. Bowel sounds are normal. She exhibits no mass. There is no tenderness. There is no rebound and no guarding.   Musculoskeletal: She exhibits no edema or tenderness.   Subtle weakness to left arm and left leg.    Neurological: She is alert and oriented to person, place, and time. She has normal strength. No cranial nerve deficit or sensory deficit.   Skin: Skin is warm and dry. No rash noted.   Psychiatric: She has a normal mood and affect.         ED Course   Procedures  Labs Reviewed   COMPREHENSIVE METABOLIC PANEL - Abnormal; Notable for the following:        Result Value    CO2 22 (*)     Albumin 3.4 (*)     Anion Gap 5 (*)     All other components within normal limits   LIPID PANEL - Abnormal; Notable for the following:     Cholesterol 247 (*)     HDL 85 (*)     All other components within normal limits   COMPREHENSIVE METABOLIC PANEL - Abnormal; Notable for the following:     CO2 21 (*)     Albumin 3.1 (*)     All other components within normal limits   CBC W/ AUTO DIFFERENTIAL - Abnormal; Notable for the following:     Hematocrit 36.6 (*)     All other components within normal limits   CBC W/ AUTO DIFFERENTIAL   PROTIME-INR   TSH   HCG, QUANTITATIVE, PREGNANCY   ALCOHOL,MEDICAL (ETHANOL)   DRUG SCREEN PANEL, URINE EMERGENCY   POCT GLUCOSE   POCT URINE PREGNANCY   TYPE & SCREEN   POCT GLUCOSE   ISTAT PROCEDURE   ISTAT CREATININE     EKG Readings: (Independently Interpreted)   Normal sinus rhythm at 90. No ischemic changes        X-Rays:   Independently Interpreted Readings:   Head CT: No acute findings.     Medical Decision Making:   History:   Old Medical  Records: I decided to obtain old medical records.  Differential Diagnosis:   Discussed with stroke neurology they will evaluate.   Independently Interpreted Test(s):   I have ordered and independently interpreted EKG Reading(s) - see prior notes  Clinical Tests:   Lab Tests: Ordered and Reviewed  Radiological Study: Ordered and Reviewed  Medical Tests: Ordered and Reviewed            Scribe Attestation:   Scribe #1: I performed the above scribed service and the documentation accurately describes the services I performed. I attest to the accuracy of the note.    Attending Attestation:         Attending Critical Care:   Critical Care Times:   Direct Patient Care (initial evaluation, reassessments, and time considering the case)................................................................22 minutes.   Additional History from reviewing old medical records or taking additional history from the family, EMS, PCP, etc.......................3 minutes.   Ordering, Reviewing, and Interpreting Diagnostic Studies...............................................................................................................3 minutes.   Documentation..................................................................................................................................................................................3 minutes.   Consultation with other Physicians. .................................................................................................................................................3 minutes.   ==============================================================  · Total Critical Care Time - exclusive of procedural time: 34 minutes.  ==============================================================    Physician Attestation for Scribe:  Physician Attestation Statement for Scribe #1: I, Dr. Marroquin, reviewed documentation, as scribed by Marcia Ferrari in my presence, and it is both accurate and complete.                  ED Course     Clinical Impression:   The encounter diagnosis was Transient ischemic attack.    Disposition:   Disposition: Placed in Observation  Condition: Stable       John Marroquin MD  01/20/17 1017

## 2017-01-20 NOTE — ED TRIAGE NOTES
"Kary Magaña, a 36 y.o. female presents to the ED c/o left sided weakness that started at 630 pm. Pt states she was having blurred vision and slurred speech at time of onset but is not currently having blurred vision, difficulty speaking, slurred speech or HA. Pt reports left sided feeling "heavier" at this time.      Chief Complaint   Patient presents with    Aphasia     Pt presents to the ED via EMS from home. EMS reports sudden onset of left sided weakness, slurred speech and facial droop that began around 6:30pm.      Review of patient's allergies indicates:  No Known Allergies  No past medical history on file.   "

## 2017-01-20 NOTE — PT/OT/SLP EVAL
"Occupational Therapy  Evaluation/ Discharge Summary    Kary Magaña   MRN: [id number]   Admitting Diagnosis: Left-sided weakness    OT Date of Treatment: 17   OT Start Time: 711  OT Stop Time: 724  OT Total Time (min): 13 min    Billable Minutes:  Evaluation 13    Diagnosis: Left-sided weakness     Past Medical History   Diagnosis Date    Sickle cell trait       History reviewed. No pertinent past surgical history.    Referring physician: Vincent  Date referred to OT:   General Precautions: Standard, aspiration, fall  Orthopedic Precautions: N/A  Braces: N/A    Do you have any cultural, spiritual, Yarsani conflicts, given your current situation?: Mu-ism     Patient History:  Prior level of function:   Patient resides in [place] with 7 children ages 17, 13, 12, 10, 8, 5 and 2 in [facility] with bedroom and bathroom on 2nd floor.  Patient is right handed.  PTA patient independent with ADLs including driving.  Currently unemployed.  Hobbies:  caring for the kids (1 with epilepsy and 3 with sickle cell).  Responsibilities:  cooking, cleaning, finances, grocery shopping, laundry,.      Subjective:  Communicated with nurse prior to session.  Patient:  "I normally only get 1-2 hours of sleep a night.  If ever I get tired, I take a caffiene pill."  "I was doing paperwork and started feeling fully.  Then the whole left side went numb and I was talking weird.  It still feels heavy on the left."  Pain Ratin/10   Pain Rating Post-Intervention: 0/10    Objective:  Patient found with: peripheral IV, blood pressure cuff  Patient's mother not present.    Cognitive Exam:  Oriented to: Person, Place, Time and Situation  Follows Commands/attention: Follows two-step commands  Communication: clear/fluent  Memory:  No Deficits noted  Safety awareness/insight to disability: intact  Coping skills/emotional control: Appropriate to situation    Visual/perceptual:  Intact    Physical Exam:  Postural examination/scapula " alignment: Rounded shoulder  Skin integrity: Visible skin intact  Edema: none noted    Sensation:   Diminished left UE    Upper Extremity Range of Motion:  Right Upper Extremity: WNL  Left Upper Extremity: WNL    Upper Extremity Strength:  Right Upper Extremity: WNL  Left Upper Extremity: 4/5    Functional Mobility:  Bed Mobility:  Rolling/Turning to Left: Modified independent  Rolling/Turning Right: Modified independent  Scooting/Bridging: Modified Independent  Supine to Sit: Modified Independent  Sit to Supine: Modified Independent    Transfers:  Sit <> Stand Assistance: Modified Independent  Sit <> Stand Assistive Device: No Assistive Device  Bed <> Chair Technique: Stand Pivot  Bed <> Chair Transfer Assistance: Supervision    Activities of Daily Living:  Feeding Level of Assistance:  (NPO)  UE Dressing Level of Assistance: Modified independent  LE Dressing Level of Assistance: Supervision  Grooming Position: Standing  Grooming Level of Assistance: Supervision  Toileting Where Assessed: Toilet  Toileting Level of Assistance: Supervision    Additional Treatment:   Patient/ Family education provided for stroke warning signs, prevention guidelines and personal risk factors.  Patient/ Family verbalizing understanding via teach back method. Patient education provided on role of OT and need for outpt OT upon discharge.  Patient alert and oriented x 3; able to follow 4/4 one step commands.  Patient attentive and interactive throughout the session.  Patient able to identify 5/5 body parts.  Able to name 5/5 objects.  Able to sequence 7/7 days of the week and 12/12 months of the year.  Discussed the importance of sleep.  OT asked if there were any other questions; patient/ family had no further questions.      AM-PAC 6 CLICK ADL  How much help from another person does this patient currently need?  1 = Unable, Total/Dependent Assistance  2 = A lot, Maximum/Moderate Assistance  3 = A little, Minimum/Contact  "Guard/Supervision  4 = None, Modified [place]/Independent    Putting on and taking off regular lower body clothing? : 3  Bathing (including washing, rinsing, drying)?: 3  Toileting, which includes using toilet, bedpan, or urinal? : 3  Putting on and taking off regular upper body clothing?: 3  Taking care of personal grooming such as brushing teeth?: 3  Eating meals?: 4  Total Score: 19    AM-PAC Raw Score CMS "G-Code Modifier Level of Impairment Assistance   6 % Total / Unable   7 - 9 CM 80 - 100% Maximal Assist   10 - 14 CL 60 - 80% Moderate Assist   15 - 19 CK 40 - 60% Moderate Assist   20 - 22 CJ 20 - 40% Minimal Assist   23 CI 1-20% SBA / CGA   24 CH 0% Independent/ Mod I       Patient left supine with all lines intact    Assessment:  Kary Magaña is a 36 y.o. female with a medical diagnosis of Left-sided weakness and presents with performance deficits of physical skills including impaired strength, sensation;  demonstrating performance deficits of psychosocial skills including impairments of coping strategies which are skills necessary to successfully and appropriately participate in everyday tasks and social situations.  These performance deficits have resulted in activity limitations including but not limited to:   transfers, ascending/ descending stairs, walking long distances, upper body dressing, lower body dressing, brushing teeth, toileting, bathing, trimming nails, carrying objects, typing, writing, reading, balancing checkbook, shopping, meal preparation, school, community roles, and assisting others in self care.   Patient's role as mother and independent caretaker for self has been affected. Patient will benefit from skilled OT services to maximize level of independence with self-care skills and functional mobility.  Will benefit from outpt OT.    Rehab identified problem list/impairments: Rehab identified problem list/impairments: weakness, gait instability, impaired functional " mobilty, impaired self care skills    Rehab potential is good.    Activity tolerance: Good    Discharge recommendations: Discharge Facility/Level Of Care Needs: outpatient OT     Barriers to discharge: Barriers to Discharge: None    Equipment recommendations: none     GOALS:   Occupational Therapy Goals        Problem: Occupational Therapy Goal    Goal Priority Disciplines Outcome Interventions   Occupational Therapy Goal     OT, PT/OT     Description:  Goals not set 2* patient scheduled to d/c home.              PLAN:  Discharge from OT services 2* scheduled hospital d/c.  Plan of Care reviewed with: patient and mother    OT G-codes  Functional Assessment Tool Used: FIM  Score: 5  Functional Limitation: Self care  Self Care Current Status (): JAMES  Self Care Goal Status (): CI  Self Care Discharge Status (): DALY Bangura  01/20/2017

## 2017-01-20 NOTE — DISCHARGE SUMMARY
[facility]  Vascular Neurology  Comprehensive Stroke Center  Discharge Summary     Summary:     Admit Date: 2017  8:07 PM    Discharge Date and Time: 2017    Attending Physician: Dr. Dan. MD    Discharge Provider: Roxann Leung MD    History of Present Illness: Patient is a 36 year old female with no known PMH who presented to the ED with resolving left sided weakness. Patient said she experienced SOB and noticed her face was crooked. She has slurred speech and her was unable to move left arm. She didn't have a headache at the time and when arrived to ED she is almost back to baseline. She denies history of tobacco abuse, alcohol use, drug use, miscarriages and clotting disorders.      NIH stroke scale: 0     Hospital Course (synopsis of major diagnoses, care, treatment, and services provided during the course of the hospital stay): Symptoms waxing and waning.  No stroke seen on imaging; no acute stroke suspected.  Likely component of stress.  Atorvastatin, ASA prescribed for RF.  F/u PCP.      Stroke Scales  Interval: 24 hours post onset of symptoms +/- 20 mins  Level of Consciousness: 0 - alert  LOC Questions: 0 - answers both correctly  LOC Commands: 0 - performs both correctly  Best Gaze: 0 - normal  Visual: 0 - no visual loss  Facial Palsy: 0 - normal  Motor Left Arm: 0 - no drift  Motor Right Arm: 0 - no drift  Motor Left Le - no drift  Motor Right Le - no drift  Limb Ataxia: 1 - present in one limb  Sensory: 1 - mild to moderate loss  Best Language: 0 - no aphasia  Dysarthria: 0 - normal articulation  Extinction and Inattention: 0 - no neglect  NIH Stroke Scale Total: 2    MRS 1  GCS15  Assessment/Plan:     Interventions: None    Complications: None    Research Candidate?:  No    Neurological deficit at discharge: Weakness: left, Sensory Loss: left     Disposition: Home or Self Care    Final Active Diagnoses:    Diagnosis Date Noted POA    PRINCIPAL PROBLEM:   Left-sided weakness [M62.81] 01/19/2017 Yes    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     Discharge Plan:  Antithrombotics: Aspirin 325mg  Statin: Atorvastatin 40mg  Aggressive risk factor modification:  High Cholesterol, Diet and Exercise    Follow Up:  Follow-up Information     Follow up with Primary Doctor No.        Follow up with [facility] - Internal Medicine.    Specialty:  Internal Medicine    Contact information:    [address]  [facility] [address] [phone]    Additional information:    [facility] Bld[facility].        Patient Instructions:     Diet general     Activity as tolerated       Medications:  Reconciled Home Medications:   Current Discharge Medication List      START taking these medications    Details   aspirin 325 MG tablet Take 1 tablet (325 mg total) by mouth once daily.  Refills: 0      atorvastatin (LIPITOR) 40 MG tablet Take 1 tablet (40 mg total) by mouth once daily.  Qty: 90 tablet, Refills: 3      escitalopram oxalate (LEXAPRO) 10 MG tablet Take 1 tablet (10 mg total) by mouth once daily.  Qty: 30 tablet, Refills: 11         STOP taking these medications       caffeine 200 mg Tab Comments:   Reason for Stopping:               Roxann Leung MD  Comprehensive Stroke Center  Department of Vascular Neurology   [facility]

## 2017-01-20 NOTE — SUBJECTIVE & OBJECTIVE
Past Medical History   Diagnosis Date    Sickle cell trait      History reviewed. No pertinent past surgical history.  History reviewed. No pertinent family history.  Social History   Substance Use Topics    Smoking status: Never Smoker    Smokeless tobacco: None    Alcohol use Yes      Comment: socially     Review of patient's allergies indicates:  No Known Allergies  Medications: I have reviewed the current medication administration record.      (Not in a hospital admission)    Review of Systems   Constitutional: Negative for chills and fever.   HENT: Negative for drooling and rhinorrhea.    Eyes: Negative for discharge and visual disturbance.   Respiratory: Negative for cough and shortness of breath.    Cardiovascular: Negative for chest pain and palpitations.   Gastrointestinal: Negative for diarrhea and vomiting.   Genitourinary: Negative for dysuria and urgency.   Musculoskeletal: Negative for arthralgias and back pain.   Skin: Negative for pallor and rash.   Neurological: Negative for dizziness and facial asymmetry.   Hematological: Does not bruise/bleed easily.   Psychiatric/Behavioral: Negative for agitation.     Objective:     Vital Signs (Most Recent):  Temp: 98.7 °F (37.1 °C) (01/19/17 2001)  Pulse: 104 (01/19/17 2102)  Resp: 20 (01/19/17 2102)  BP: 138/86 (01/19/17 2102)  SpO2: 100 % (01/19/17 2102)    Vital Signs Range (Last 24H):  Temp:  [98.7 °F (37.1 °C)]   Pulse:  [101-107]   Resp:  [18-20]   BP: (138-158)/(84-94)   SpO2:  [100 %]     Physical Exam   Constitutional: She appears well-developed.   HENT:   Head: Normocephalic and atraumatic.   Eyes: EOM are normal. Pupils are equal, round, and reactive to light.   Neck: Neck supple.   Cardiovascular:   tachycardic   Pulmonary/Chest: Effort normal. No respiratory distress.   Abdominal:   Not examined    Genitourinary:   Genitourinary Comments: Not examined   Musculoskeletal: Normal range of motion.   Neurological:   See below     Skin: No rash  noted. No erythema.   Psychiatric: She has a normal mood and affect.       Neurological Exam:   LOC: alert and follows requests  Language: No aphasia  Speech: No dysarthria  Orientation: Person, Place, Time  Memory: Recent memory intact, Remote memory intact, Age correct, Month correct  Visual Fields (CN II): Full  EOM (CN III, IV, VI): Full/intact  Oculocephalics: not examined  Pupils (CN III, IV, VI): PERRL  Facial Sensation (CN V): Symmetric  Facial Movement (CN VII): symmetric facial expression  Hearing (CN VIII): not done  Gag Reflex (CN IX, X): not done  Shoulder/Neck (CN XI): not exmained  Tongue (CN XII): midline   Reflexes: not examined   Motor*: Arm Left:  Paretic:  4/5, Leg Left:   Paretic:  4/5, Arm Right:   Normal (5/5), Leg Right:   Normal (5/5)  Cerebellar*: normal finger to nose  Sensation:intact   Tone: Arm-Left: normal; Leg-Left: normal; Arm-Right: normal; Leg-Right: normal    NIH Stroke Scale:  Interval: baseline (upon arrival/admit)  Level of Consciousness: 0 - alert  LOC Questions: 0 - answers both correctly  LOC Commands: 0 - performs both correctly  Best Gaze: 0 - normal  Visual: 0 - no visual loss  Facial Palsy: 0 - normal  Motor Left Arm: 0 - no drift  Motor Right Arm: 0 - no drift  Motor Left Le - no drift  Motor Right Le - no drift  Limb Ataxia: 0 - absent  Sensory: 0 - normal  Best Language: 0 - no aphasia  Dysarthria: 0 - normal articulation  Extinction and Inattention: 0 - no neglect  NIH Stroke Scale Total: 0  Modified Elvi Scale:   Timeline: Prior to symptoms onset  Modified McNairy Score: 0 - no symptoms    ABCD2 Scale for TIA:   Age > or = 60: 0 - no  B/P or = 140/9 at Initial Evaluation: 1 - yes  Clinical Features of TIA: 2 - unilateral weakness  Duration of Symptoms: 1 - 10-59 minutes  Diabetes Mellitus in History: 0 - no  ABCD2 Scale Total: 4      Laboratory:  CMP:   Recent Labs  Lab 17   CALCIUM 8.9   ALBUMIN 3.4*   PROT 7.3      K 3.9   CO2 22*   CL  109   BUN 8   CREATININE 0.8   ALKPHOS 56   ALT 19   AST 20   BILITOT 0.3     CBC:   Recent Labs  Lab 01/19/17 2029   WBC 7.70   RBC 4.44   HGB 13.4   HCT 38.8      MCV 87   MCH 30.2   MCHC 34.5     Lipid Panel:   Recent Labs  Lab 01/19/17 2029   CHOL 247*   LDLCALC 142.0   HDL 85*   TRIG 100     Hgb A1C: No results for input(s): HGBA1C in the last 168 hours.  TSH:   Recent Labs  Lab 01/19/17 2029   TSH 2.020       Diagnostic Results:  Brain Imaging:  No studies to review

## 2017-10-12 ENCOUNTER — TELEPHONE (OUTPATIENT)
Dept: OBSTETRICS AND GYNECOLOGY | Facility: CLINIC | Age: 37
End: 2017-10-12

## 2017-10-12 NOTE — TELEPHONE ENCOUNTER
----- Message from Preethi Garcia sent at 10/12/2017  9:29 AM CDT -----  Contact: Self/ [phone]  New patient would like to speak with you about being seen sooner than 11/9 because she is far along in her pregnancy. Please advise.

## 2017-10-12 NOTE — TELEPHONE ENCOUNTER
Tried to contact patient multiple times and no answer. Unable to leave message due to phone being busy.

## 2017-10-12 NOTE — TELEPHONE ENCOUNTER
----- Message from Salome Keith sent at 10/12/2017 10:33 AM CDT -----  Contact: [phone]  Patient called back with a new phone #  For you to reach her regarding a message she sent for an appt. sooner

## 2017-10-30 ENCOUNTER — TELEPHONE (OUTPATIENT)
Dept: OBSTETRICS AND GYNECOLOGY | Facility: CLINIC | Age: 37
End: 2017-10-30

## 2017-11-09 ENCOUNTER — OFFICE VISIT (OUTPATIENT)
Dept: OBSTETRICS AND GYNECOLOGY | Facility: CLINIC | Age: 37
End: 2017-11-09
Payer: MEDICAID

## 2017-11-09 ENCOUNTER — TELEPHONE (OUTPATIENT)
Dept: OBSTETRICS AND GYNECOLOGY | Facility: CLINIC | Age: 37
End: 2017-11-09

## 2017-11-09 VITALS — WEIGHT: 162.5 LBS | BODY MASS INDEX: 25.45 KG/M2

## 2017-11-09 DIAGNOSIS — O09.522 ELDERLY MULTIGRAVIDA IN SECOND TRIMESTER: ICD-10-CM

## 2017-11-09 DIAGNOSIS — Z32.01 POSITIVE URINE PREGNANCY TEST: ICD-10-CM

## 2017-11-09 DIAGNOSIS — O09.32 LATE PRENATAL CARE AFFECTING PREGNANCY IN SECOND TRIMESTER: ICD-10-CM

## 2017-11-09 DIAGNOSIS — Z64.1 GRAND MULTIPARITY: ICD-10-CM

## 2017-11-09 DIAGNOSIS — N91.2 ABSENT MENSES: Primary | ICD-10-CM

## 2017-11-09 DIAGNOSIS — F41.9 ANXIETY: ICD-10-CM

## 2017-11-09 DIAGNOSIS — D57.3 SICKLE CELL TRAIT: ICD-10-CM

## 2017-11-09 PROBLEM — O09.529 ADVANCED MATERNAL AGE IN MULTIGRAVIDA: Status: ACTIVE | Noted: 2017-11-09

## 2017-11-09 PROBLEM — G81.04 FLACCID HEMIPLEGIA AFFECTING LEFT NONDOMINANT SIDE: Status: RESOLVED | Noted: 2017-01-19 | Resolved: 2017-11-09

## 2017-11-09 PROBLEM — R53.1 LEFT-SIDED WEAKNESS: Status: RESOLVED | Noted: 2017-01-19 | Resolved: 2017-11-09

## 2017-11-09 LAB
CANDIDA RRNA VAG QL PROBE: NEGATIVE
G VAGINALIS RRNA GENITAL QL PROBE: POSITIVE
T VAGINALIS RRNA GENITAL QL PROBE: NEGATIVE

## 2017-11-09 PROCEDURE — 99999 PR PBB SHADOW E&M-EST. PATIENT-LVL III: CPT | Mod: PBBFAC,,, | Performed by: OBSTETRICS & GYNECOLOGY

## 2017-11-09 PROCEDURE — 87086 URINE CULTURE/COLONY COUNT: CPT

## 2017-11-09 PROCEDURE — 99204 OFFICE O/P NEW MOD 45 MIN: CPT | Mod: TH,S$PBB,, | Performed by: OBSTETRICS & GYNECOLOGY

## 2017-11-09 PROCEDURE — 87480 CANDIDA DNA DIR PROBE: CPT

## 2017-11-09 PROCEDURE — 87591 N.GONORRHOEAE DNA AMP PROB: CPT

## 2017-11-09 PROCEDURE — 99213 OFFICE O/P EST LOW 20 MIN: CPT | Mod: PBBFAC,PN | Performed by: OBSTETRICS & GYNECOLOGY

## 2017-11-09 PROCEDURE — 87660 TRICHOMONAS VAGIN DIR PROBE: CPT

## 2017-11-09 PROCEDURE — 88175 CYTOPATH C/V AUTO FLUID REDO: CPT

## 2017-11-09 PROCEDURE — 87624 HPV HI-RISK TYP POOLED RSLT: CPT

## 2017-11-09 RX ORDER — VITAMIN A ACETATE, BETA CAROTENE, ASCORBIC ACID, CHOLECALCIFEROL, .ALPHA.-TOCOPHEROL ACETATE, DL-, THIAMINE MONONITRATE, RIBOFLAVIN, NIACINAMIDE, PYRIDOXINE HYDROCHLORIDE, FOLIC ACID, CYANOCOBALAMIN, CALCIUM CARBONATE, FERROUS FUMARATE, ZINC OXIDE, CUPRIC OXIDE 3080; 12; 120; 400; 1; 1.84; 3; 20; 22; 920; 25; 200; 27; 10; 2 [IU]/1; UG/1; MG/1; [IU]/1; MG/1; MG/1; MG/1; MG/1; MG/1; [IU]/1; MG/1; MG/1; MG/1; MG/1; MG/1
1 TABLET, FILM COATED ORAL DAILY
Refills: 0 | COMMUNITY
Start: 2017-09-05 | End: 2018-03-09

## 2017-11-09 NOTE — PROGRESS NOTES
OBSTETRICS OFFICE NOTE  Reason for visit: Absence of menses    HPI: Pt is a 37 y.o.  female  who presents with complaint of absence of menstruation.  She denies nausea/vomiting/abdominal pain/bleeding.  UPT is positive. LMP:unsure . Reports previously regular cycles. Went to ED on 17 and was given due date of 2018. Last pap  ~2yrs ago. Reports unplanned pregnancy and has been in denial. Also reports hx of anxiety and depression. States she was prescribed 10mg but takes 3 pills a day. Pt with hx of sickle cell trait and FOB () also has the trait and 3 out of 8 children have sickle cell disease and the remaining 5 have the trait. Interested in tubal ligation for contraception. Per U/S in ED 17 was 18w2d for EDC of 18- make her 27w4d today.     Past Medical History:   Diagnosis Date    Sickle cell trait        History reviewed. No pertinent surgical history.    History reviewed. No pertinent family history.    Social History   Substance Use Topics    Smoking status: Never Smoker    Smokeless tobacco: Never Used    Alcohol use No      Comment: socially prior to pregnancy       OB History    Para Term  AB Living   10 8 7 1 1 8   SAB TAB Ectopic Multiple Live Births   1       8      # Outcome Date GA Lbr Juan/2nd Weight Sex Delivery Anes PTL Lv   10 Current            9 Term 01/13/15 39w0d  3.487 kg (7 lb 11 oz) F Vag-Spont   RL   8 Term 11 37w0d  3.487 kg (7 lb 11 oz) M Vag-Spont   RL   7 Term 10/03/08 38w0d  3.487 kg (7 lb 11 oz) M Vag-Spont   RL   6 Term 06 37w0d  3.487 kg (7 lb 11 oz) M Vag-Spont   RL   5  04 36w0d  3.033 kg (6 lb 11 oz) M Vag-Spont   RL   4 Term 03 38w0d  3.487 kg (7 lb 11 oz) M Vag-Spont   RL   3 Term 07/15/99 37w5d  3.487 kg (7 lb 11 oz) M Vag-Spont   RL   2 Term 97 37w0d  3.827 kg (8 lb 7 oz) M Vag-Spont   RL   1 SAB  4w0d                  Current Outpatient Prescriptions   Medication Sig    escitalopram oxalate (LEXAPRO) 10 MG tablet Take 1 tablet (10 mg total) by mouth once daily.    prenatal 21-iron fu-folic acid (PRENATAL COMPLETE) 14 mg iron- 400 mcg Tab Take 1 tablet by mouth once daily.    ondansetron (ZOFRAN-ODT) 4 MG TbDL Take 1 tablet (4 mg total) by mouth every 8 (eight) hours as needed (nausea).    PRENATAL PLUS, CALCIUM CARB, 27 mg iron- 1 mg Tab Take 1 tablet by mouth once daily.     No current facility-administered medications for this visit.        Allergies: Patient has no known allergies.     Wt 73.7 kg (162 lb 7.7 oz)   LMP 07/05/2017   BMI 25.45 kg/m²     ROS:  GENERAL:Feeling well overall. Denies fever or chills.   SKIN: Denies rash or lesions.   HEAD: Denies head injury or headache.   NODES: Denies enlarged lymph nodes.   CHEST: Denies chest pain or shortness of breath.   CARDIOVASCULAR: Denies palpitations or chest pain.   ABDOMEN: No abdominal pain, constipation, diarrhea, nausea, vomiting or rectal bleeding.   URINARY: No dysuria, hematuria, or burning on urination.  REPRODUCTIVE: See HPI.   BREASTS: Denies pain, lumps, or nipple discharge.   HEMATOLOGIC: No easy bruisability or excessive bleeding.   MUSCULOSKELETAL: Denies joint pain or swelling.   NEUROLOGIC: Denies syncope or weakness.   PSYCHIATRIC: Denies depression, anxiety or mood swings.    Physical Exam:    PE:   APPEARANCE: Well nourished, well developed, in no acute distress.  AFFECT: WNL, alert and oriented x 3.  SKIN: No acne or hirsutism.  NECK: Neck symmetric without masses or thyromegaly.  NODES: No inguinal, cervical, axillary or femoral lymph node enlargement.  CHEST: Good respiratory effort.   ABDOMEN: Soft. No tenderness or masses.  BREASTS: Symmetrical, no skin changes or visible lesions. No palpable masses, nipple discharge bilaterally.  PELVIC: Normal external female genitalia without lesions. Normal hair distribution. Adequate  perineal body, normal urethral meatus. Vagina moist and well rugated without lesions. Minimal white discharge. Cervix pink, without lesions, discharge or tenderness. No significant cystocele or rectocele. Bimanual exam shows uterus is  25 weeks, regular, mobile and nontender. Adnexa without masses or tenderness. FHT confirmed at 148  EXTREMITIES: No edema.      PROCEDURES:  - UPT: positive  - Dating U/S: to be scheduled     ASSESSMENT and PLAN:    ICD-10-CM ICD-9-CM    1. Absent menses N91.2 626.0 POCT urine pregnancy   2. Positive urine pregnancy test Z32.01 V72.42 POCT urine pregnancy      Urine culture      Type & Screen - Ob Profile      CBC auto differential      HIV-1 and HIV-2 antibodies      RPR      Hepatitis B surface antigen      Rubella antibody, IgG      C. trachomatis/N. gonorrhoeae by AMP DNA Cervix      Vaginosis Screen by DNA Probe      Liquid-based pap smear, screening      US OB/GYN Procedure ([facility])      Hemoglobin A1c      Basic metabolic panel      Cystic Fibrosis Mutation Panel      HPV High Risk Genotypes, PCR   3. Late prenatal care affecting pregnancy in second trimester- first visit at 27 weeks O09.32 V23.7    4. Sickle cell trait D57.3 282.5    5. Grand multiparity Z64.1 V61.5    6. Elderly multigravida in second trimester O09.522 659.63    7. Anxiety- on lexapro F41.9 300.00        Plan:   1. +UPT, PNL and anatomy U/S to be scheduled. Continue lexapro as prescribed. Will get scheduled with PCP to help manage anxiety/depression as well. Discuss already known fact that infant will be affected with SCT/SCD.  RTC in 2 weeks with me and will sign consents at that time    Patient was counseled today on routine 2T precautions, including vaginal bleeding and abdominal pain.  Weight: We discussed proper weight gain based on the [employer]'s recommendations based on her pre-pregnancy weight-BMI: 25-29.9= 15-25 lbs total and 0.6lb/week in 2nd-3rd trimester. Diet: Avoid raw meat ie  sushi, unpasteurized cheese, and heat up deli meat. Eat fish that are high in mercury (fran mackerel, swordfish, tuna) only 6-12 oz once  a week. Environment: Patient also given environmental precautions such as avoiding cat litter and gardening without gloves. Discussed daily prenatal vitamin with folate/iron options (i.e. stool softener, DHA) and avoidance of smoking. Regular and moderate exercise for 30 min or more per day with the avoidance of activities with a high risk of falling, prolonged supine positions, or abdominal trauma.      Return in about 2 weeks (around 11/23/2017).    Shruthi Zamarripa MD  OB/GYN  Pager: [phone]

## 2017-11-09 NOTE — TELEPHONE ENCOUNTER
----- Message from Tiffany Recinos sent at 11/9/2017  1:58 PM CST -----  Contact: [phone]  Patient requesting to speak with you regarding increase dosage of escitalopram oxalate (LEXAPRO) 10 MG tablet. Please advise.

## 2017-11-09 NOTE — TELEPHONE ENCOUNTER
Returned Patients call. Patient states she and Dr. Zamarripa talked about her medication Lexapro and she would like to know if she can increase her dosage of Lexapro. Informed patient that the  Is out until Monday so she will not hear back from us until them. Patient states it is no rush she would just like to ask. Informed patient that we will be in contact with dr. Smith response.

## 2017-11-10 LAB
C TRACH DNA SPEC QL NAA+PROBE: NOT DETECTED
N GONORRHOEA DNA SPEC QL NAA+PROBE: NOT DETECTED

## 2017-11-11 LAB — BACTERIA UR CULT: NORMAL

## 2017-11-13 RX ORDER — METRONIDAZOLE 500 MG/1
500 TABLET ORAL EVERY 12 HOURS
Qty: 14 TABLET | Refills: 0 | Status: SHIPPED | OUTPATIENT
Start: 2017-11-13 | End: 2017-11-20

## 2017-11-13 RX ORDER — ESCITALOPRAM OXALATE 10 MG/1
20 TABLET ORAL DAILY
Qty: 30 TABLET | Refills: 0 | Status: SHIPPED | OUTPATIENT
Start: 2017-11-13 | End: 2017-12-12 | Stop reason: SDUPTHER

## 2017-11-13 NOTE — TELEPHONE ENCOUNTER
The max dose of this med is 20mg and she self prescribed 30mg. I instructed pt to find a PCP to assist with managing her anxiety since psychiatry would be hard to see with her insurance I even told her to schedule with Dr. Pearson after our appointment    Shruthi Zamarripa MD, FACOG  OB/GYN  Pager: [phone]

## 2017-11-13 NOTE — TELEPHONE ENCOUNTER
Patient notified the max dose is 20 mg and she should not be taking 30 mg. Informed her that she can schedule an appointment with a PCP (referred Nyla Pearson in [place]). Kelsea states she will call to schedule an appointment. Patient verbalized understanding.

## 2017-11-14 LAB
HPV16 AG SPEC QL: NEGATIVE
HPV16+18+H RISK 12 DNA CVX-IMP: NEGATIVE
HPV18 DNA SPEC QL NAA+PROBE: NEGATIVE

## 2017-11-22 ENCOUNTER — TELEPHONE (OUTPATIENT)
Dept: OBSTETRICS AND GYNECOLOGY | Facility: CLINIC | Age: 37
End: 2017-11-22

## 2017-11-22 NOTE — TELEPHONE ENCOUNTER
Contacted patient to inform her she missed her ultrasound appointment and her appointment with Dr. Zamarripa. Also to remind her to get labs drawn. Patient did not answer. Left message to return call.

## 2017-12-12 ENCOUNTER — TELEPHONE (OUTPATIENT)
Dept: OBSTETRICS AND GYNECOLOGY | Facility: CLINIC | Age: 37
End: 2017-12-12

## 2017-12-12 ENCOUNTER — LAB VISIT (OUTPATIENT)
Dept: LAB | Facility: HOSPITAL | Age: 37
End: 2017-12-12
Attending: OBSTETRICS & GYNECOLOGY
Payer: MEDICAID

## 2017-12-12 ENCOUNTER — PROCEDURE VISIT (OUTPATIENT)
Dept: OBSTETRICS AND GYNECOLOGY | Facility: CLINIC | Age: 37
End: 2017-12-12
Payer: MEDICAID

## 2017-12-12 ENCOUNTER — ROUTINE PRENATAL (OUTPATIENT)
Dept: OBSTETRICS AND GYNECOLOGY | Facility: CLINIC | Age: 37
End: 2017-12-12
Payer: MEDICAID

## 2017-12-12 VITALS
WEIGHT: 172.63 LBS | DIASTOLIC BLOOD PRESSURE: 60 MMHG | SYSTOLIC BLOOD PRESSURE: 108 MMHG | BODY MASS INDEX: 27.04 KG/M2

## 2017-12-12 DIAGNOSIS — Z36.3 ANTENATAL SCREENING FOR MALFORMATION USING ULTRASONICS: ICD-10-CM

## 2017-12-12 DIAGNOSIS — O09.523 AMA (ADVANCED MATERNAL AGE) MULTIGRAVIDA 35+, THIRD TRIMESTER: Primary | ICD-10-CM

## 2017-12-12 DIAGNOSIS — D21.9 FIBROIDS: ICD-10-CM

## 2017-12-12 DIAGNOSIS — O09.30 INSUFFICIENT PRENATAL CARE, UNSPECIFIED TRIMESTER: ICD-10-CM

## 2017-12-12 DIAGNOSIS — O09.523 ELDERLY MULTIGRAVIDA IN THIRD TRIMESTER: ICD-10-CM

## 2017-12-12 DIAGNOSIS — O09.32 LATE PRENATAL CARE AFFECTING PREGNANCY IN SECOND TRIMESTER: Primary | ICD-10-CM

## 2017-12-12 DIAGNOSIS — Z32.01 POSITIVE URINE PREGNANCY TEST: ICD-10-CM

## 2017-12-12 DIAGNOSIS — D57.3 SICKLE CELL TRAIT: ICD-10-CM

## 2017-12-12 DIAGNOSIS — Z64.1 GRAND MULTIPARITY: ICD-10-CM

## 2017-12-12 DIAGNOSIS — F41.9 ANXIETY: ICD-10-CM

## 2017-12-12 LAB
ABO + RH BLD: NORMAL
ANION GAP SERPL CALC-SCNC: 6 MMOL/L
BASOPHILS # BLD AUTO: 0.05 K/UL
BASOPHILS NFR BLD: 0.3 %
BLD GP AB SCN CELLS X3 SERPL QL: NORMAL
BUN SERPL-MCNC: 5 MG/DL
CALCIUM SERPL-MCNC: 8.2 MG/DL
CHLORIDE SERPL-SCNC: 107 MMOL/L
CO2 SERPL-SCNC: 24 MMOL/L
CREAT SERPL-MCNC: 0.6 MG/DL
DIFFERENTIAL METHOD: ABNORMAL
EOSINOPHIL # BLD AUTO: 0.4 K/UL
EOSINOPHIL NFR BLD: 2.5 %
ERYTHROCYTE [DISTWIDTH] IN BLOOD BY AUTOMATED COUNT: 13.7 %
EST. GFR  (AFRICAN AMERICAN): >60 ML/MIN/1.73 M^2
EST. GFR  (NON AFRICAN AMERICAN): >60 ML/MIN/1.73 M^2
ESTIMATED AVG GLUCOSE: 97 MG/DL
GLUCOSE SERPL-MCNC: 76 MG/DL
HBA1C MFR BLD HPLC: 5 %
HCT VFR BLD AUTO: 28.1 %
HGB BLD-MCNC: 9.4 G/DL
LYMPHOCYTES # BLD AUTO: 1.9 K/UL
LYMPHOCYTES NFR BLD: 11.9 %
MCH RBC QN AUTO: 29.8 PG
MCHC RBC AUTO-ENTMCNC: 33.5 G/DL
MCV RBC AUTO: 89 FL
MONOCYTES # BLD AUTO: 1.1 K/UL
MONOCYTES NFR BLD: 6.8 %
NEUTROPHILS # BLD AUTO: 12.3 K/UL
NEUTROPHILS NFR BLD: 76.2 %
PLATELET # BLD AUTO: 211 K/UL
PMV BLD AUTO: 10.7 FL
POTASSIUM SERPL-SCNC: 3.7 MMOL/L
RBC # BLD AUTO: 3.15 M/UL
SODIUM SERPL-SCNC: 137 MMOL/L
WBC # BLD AUTO: 16.09 K/UL

## 2017-12-12 PROCEDURE — 87340 HEPATITIS B SURFACE AG IA: CPT

## 2017-12-12 PROCEDURE — 86900 BLOOD TYPING SEROLOGIC ABO: CPT

## 2017-12-12 PROCEDURE — 83036 HEMOGLOBIN GLYCOSYLATED A1C: CPT

## 2017-12-12 PROCEDURE — 86592 SYPHILIS TEST NON-TREP QUAL: CPT

## 2017-12-12 PROCEDURE — 86762 RUBELLA ANTIBODY: CPT

## 2017-12-12 PROCEDURE — 36415 COLL VENOUS BLD VENIPUNCTURE: CPT

## 2017-12-12 PROCEDURE — 76805 OB US >/= 14 WKS SNGL FETUS: CPT | Mod: PBBFAC,PO

## 2017-12-12 PROCEDURE — 76805 OB US >/= 14 WKS SNGL FETUS: CPT | Mod: 26,S$PBB,, | Performed by: OBSTETRICS & GYNECOLOGY

## 2017-12-12 PROCEDURE — 99999 PR PBB SHADOW E&M-EST. PATIENT-LVL II: CPT | Mod: PBBFAC,,, | Performed by: OBSTETRICS & GYNECOLOGY

## 2017-12-12 PROCEDURE — 80048 BASIC METABOLIC PNL TOTAL CA: CPT

## 2017-12-12 PROCEDURE — 81220 CFTR GENE COM VARIANTS: CPT

## 2017-12-12 PROCEDURE — 85025 COMPLETE CBC W/AUTO DIFF WBC: CPT

## 2017-12-12 PROCEDURE — 86850 RBC ANTIBODY SCREEN: CPT

## 2017-12-12 PROCEDURE — 99213 OFFICE O/P EST LOW 20 MIN: CPT | Mod: TH,25,S$PBB, | Performed by: OBSTETRICS & GYNECOLOGY

## 2017-12-12 PROCEDURE — 99212 OFFICE O/P EST SF 10 MIN: CPT | Mod: PBBFAC,PO | Performed by: OBSTETRICS & GYNECOLOGY

## 2017-12-12 PROCEDURE — 86703 HIV-1/HIV-2 1 RESULT ANTBDY: CPT

## 2017-12-12 RX ORDER — ASPIRIN 81 MG
100 TABLET, DELAYED RELEASE (ENTERIC COATED) ORAL 2 TIMES DAILY PRN
Qty: 60 TABLET | Refills: 3 | Status: SHIPPED | OUTPATIENT
Start: 2017-12-12 | End: 2018-03-09

## 2017-12-12 RX ORDER — ESCITALOPRAM OXALATE 10 MG/1
20 TABLET ORAL DAILY
Qty: 60 TABLET | Refills: 6 | Status: SHIPPED | OUTPATIENT
Start: 2017-12-12 | End: 2018-01-26

## 2017-12-12 RX ORDER — FERROUS SULFATE 325(65) MG
325 TABLET, DELAYED RELEASE (ENTERIC COATED) ORAL 2 TIMES DAILY
Qty: 60 TABLET | Refills: 3 | Status: SHIPPED | OUTPATIENT
Start: 2017-12-12 | End: 2018-12-12

## 2017-12-12 NOTE — TELEPHONE ENCOUNTER
----- Message from Holli Aleena sent at 12/12/2017  1:33 PM CST -----  Contact: self, [phone]  Patient called in returning your call. Please advise.

## 2017-12-12 NOTE — PROGRESS NOTES
Good fm.  Denies ctx, vb, lof. U/S reviewed and consents signed. Male- desires circ, interested in tubal.  RTC in 2 weeks. Given precautions. lexapro refilled. Given information on [id number]

## 2017-12-12 NOTE — TELEPHONE ENCOUNTER
Contacted patient to inform her she has not had her ultrasound or labs drawn yet. Patient did not answer. Left message to return call.

## 2017-12-12 NOTE — TELEPHONE ENCOUNTER
----- Message from Keenan Santa sent at 12/12/2017 12:38 PM CST -----  Contact: [phone]/self  Pt states the pharmacy never received the prescription escitalopram oxalate (LEXAPRO) 10 MG tablet.   Please advise

## 2017-12-13 LAB
HBV SURFACE AG SERPL QL IA: NEGATIVE
HIV 1+2 AB+HIV1 P24 AG SERPL QL IA: NEGATIVE
RPR SER QL: NORMAL
RUBV IGG SER-ACNC: 14.7 IU/ML
RUBV IGG SER-IMP: REACTIVE

## 2017-12-15 LAB — CFTR MUT ANL BLD/T: NORMAL

## 2017-12-18 PROBLEM — D25.0 SUBMUCOUS LEIOMYOMA OF UTERUS: Status: ACTIVE | Noted: 2017-12-18

## 2017-12-28 ENCOUNTER — ROUTINE PRENATAL (OUTPATIENT)
Dept: OBSTETRICS AND GYNECOLOGY | Facility: CLINIC | Age: 37
End: 2017-12-28
Payer: MEDICAID

## 2017-12-28 VITALS
DIASTOLIC BLOOD PRESSURE: 64 MMHG | WEIGHT: 178.25 LBS | SYSTOLIC BLOOD PRESSURE: 112 MMHG | BODY MASS INDEX: 27.92 KG/M2

## 2017-12-28 DIAGNOSIS — Z64.1 GRAND MULTIPARITY: Primary | ICD-10-CM

## 2017-12-28 PROCEDURE — 99999 PR PBB SHADOW E&M-EST. PATIENT-LVL III: CPT | Mod: PBBFAC,,, | Performed by: OBSTETRICS & GYNECOLOGY

## 2017-12-28 PROCEDURE — 99213 OFFICE O/P EST LOW 20 MIN: CPT | Mod: TH,S$PBB,, | Performed by: OBSTETRICS & GYNECOLOGY

## 2017-12-28 PROCEDURE — 99213 OFFICE O/P EST LOW 20 MIN: CPT | Mod: PBBFAC,PO | Performed by: OBSTETRICS & GYNECOLOGY

## 2017-12-28 RX ORDER — ESCITALOPRAM OXALATE 20 MG/1
20 TABLET ORAL DAILY
Refills: 6 | COMMUNITY
Start: 2017-12-12 | End: 2021-04-06 | Stop reason: SDUPTHER

## 2017-12-28 NOTE — PROGRESS NOTES
Good fm.  Denies ctx, vb, lof. RTC in 2 weeks with u/S prior. Start taking ferrous sulfate twice a day. No interested in mirena because she doesn't want any down time with surgery.

## 2018-01-10 ENCOUNTER — ROUTINE PRENATAL (OUTPATIENT)
Dept: OBSTETRICS AND GYNECOLOGY | Facility: CLINIC | Age: 38
End: 2018-01-10
Payer: MEDICAID

## 2018-01-10 ENCOUNTER — CLINICAL SUPPORT (OUTPATIENT)
Dept: OBSTETRICS AND GYNECOLOGY | Facility: CLINIC | Age: 38
End: 2018-01-10
Payer: MEDICAID

## 2018-01-10 ENCOUNTER — PROCEDURE VISIT (OUTPATIENT)
Dept: OBSTETRICS AND GYNECOLOGY | Facility: CLINIC | Age: 38
End: 2018-01-10
Payer: MEDICAID

## 2018-01-10 VITALS
BODY MASS INDEX: 26.79 KG/M2 | SYSTOLIC BLOOD PRESSURE: 104 MMHG | DIASTOLIC BLOOD PRESSURE: 76 MMHG | WEIGHT: 171.06 LBS

## 2018-01-10 DIAGNOSIS — O09.523 AMA (ADVANCED MATERNAL AGE) MULTIGRAVIDA 35+, THIRD TRIMESTER: ICD-10-CM

## 2018-01-10 DIAGNOSIS — O26.849 UTERINE SIZE DATE DISCREPANCY, ANTEPARTUM, UNSPECIFIED TRIMESTER: ICD-10-CM

## 2018-01-10 DIAGNOSIS — D57.3 SICKLE CELL TRAIT: ICD-10-CM

## 2018-01-10 DIAGNOSIS — Z36.89 ENCOUNTER FOR ULTRASOUND TO CHECK FETAL GROWTH: Primary | ICD-10-CM

## 2018-01-10 DIAGNOSIS — Z23 NEED FOR DIPHTHERIA-TETANUS-PERTUSSIS (TDAP) VACCINE: Primary | ICD-10-CM

## 2018-01-10 DIAGNOSIS — Z64.1 GRAND MULTIPARITY: Primary | ICD-10-CM

## 2018-01-10 DIAGNOSIS — O09.529 ENCOUNTER FOR SUPERVISION OF HIGH RISK MULTIGRAVIDA OF ADVANCED MATERNAL AGE, ANTEPARTUM: ICD-10-CM

## 2018-01-10 DIAGNOSIS — D25.0 SUBMUCOUS LEIOMYOMA OF UTERUS: ICD-10-CM

## 2018-01-10 DIAGNOSIS — O09.32 LATE PRENATAL CARE AFFECTING PREGNANCY IN SECOND TRIMESTER: ICD-10-CM

## 2018-01-10 DIAGNOSIS — O09.523 ELDERLY MULTIGRAVIDA IN THIRD TRIMESTER: ICD-10-CM

## 2018-01-10 PROCEDURE — 99213 OFFICE O/P EST LOW 20 MIN: CPT | Mod: 25,TH,S$PBB, | Performed by: OBSTETRICS & GYNECOLOGY

## 2018-01-10 PROCEDURE — 87081 CULTURE SCREEN ONLY: CPT

## 2018-01-10 PROCEDURE — 76816 OB US FOLLOW-UP PER FETUS: CPT | Mod: 26,S$PBB,, | Performed by: OBSTETRICS & GYNECOLOGY

## 2018-01-10 PROCEDURE — 76819 FETAL BIOPHYS PROFIL W/O NST: CPT | Mod: 26,S$PBB,, | Performed by: OBSTETRICS & GYNECOLOGY

## 2018-01-10 PROCEDURE — 76819 FETAL BIOPHYS PROFIL W/O NST: CPT | Mod: PBBFAC,PO

## 2018-01-10 PROCEDURE — 99213 OFFICE O/P EST LOW 20 MIN: CPT | Mod: PBBFAC,PO,25 | Performed by: OBSTETRICS & GYNECOLOGY

## 2018-01-10 PROCEDURE — 90471 IMMUNIZATION ADMIN: CPT | Mod: PBBFAC,PO

## 2018-01-10 PROCEDURE — 99999 PR PBB SHADOW E&M-EST. PATIENT-LVL III: CPT | Mod: PBBFAC,,, | Performed by: OBSTETRICS & GYNECOLOGY

## 2018-01-10 PROCEDURE — 76816 OB US FOLLOW-UP PER FETUS: CPT | Mod: PBBFAC,PO

## 2018-01-10 RX ORDER — LEVONORGESTREL 52 MG/1
INTRAUTERINE DEVICE INTRAUTERINE
Refills: 0 | COMMUNITY
Start: 2017-12-29 | End: 2018-03-09

## 2018-01-10 NOTE — PROGRESS NOTES
1/10/18 at 1123 administered 0.5 ml of Adacel (Tdap) to L deltoid.   Pt tolerated well.   Pt advised to wait 15 minutes before leaving the office.   Pt verbalized understanding.   Pt received VIS.  Lot: [id number]  Exp: 4/27/19  Man: [employer]

## 2018-01-13 LAB — BACTERIA SPEC AEROBE CULT: NORMAL

## 2018-01-16 ENCOUNTER — TELEPHONE (OUTPATIENT)
Dept: OBSTETRICS AND GYNECOLOGY | Facility: CLINIC | Age: 38
End: 2018-01-16

## 2018-01-16 RX ORDER — OSELTAMIVIR PHOSPHATE 75 MG/1
75 CAPSULE ORAL 2 TIMES DAILY
Qty: 10 CAPSULE | Refills: 0 | Status: SHIPPED | OUTPATIENT
Start: 2018-01-16 | End: 2018-01-21

## 2018-01-16 NOTE — TELEPHONE ENCOUNTER
Patient states she started feeling like this Saturday evening. Informed to take tamiflu and she can take tylenol for fever but not to exceed 4 grams a day. Patient also notified if she has difficulty breathing or chest pains to go straight to the hospital. Patient asked if she still needs to come to her appt tomorrow. Informed I will ask provider and informed if she does come in she will be given a mask.

## 2018-01-16 NOTE — TELEPHONE ENCOUNTER
----- Message from Romain Dunn sent at 1/16/2018  8:37 AM CST -----  Contact: [phone] self  Patient requested to speak with the doctor because she may have the flu and wanted to verify what she can take because she is pregnant. Please call and advise.

## 2018-01-16 NOTE — TELEPHONE ENCOUNTER
Contacted pt and informed her to not come in to appt tomorrow 01/17 at 11:15am due to pt having the flu. Patient verbalized understanding.

## 2018-01-16 NOTE — TELEPHONE ENCOUNTER
Prescription sent. How long has she had her symptoms? Its best if she starts medication within 48 hrs of symptoms. Also she can treat her fever with tylenol (not to exceed 4grams a day). If she experiences any chest pain or difficulty breathing she needs to come straight to the hospital. Otherwise recommended to stay home to decrease infectivity    Shruthi Zamarripa MD, FACOG  OB/GYN  Pager: [phone]

## 2018-01-16 NOTE — TELEPHONE ENCOUNTER
Patient states she thinks she has the flu. Patient has sore throat body aches, patient states she has a fever but she does not have a thermometer to check it but she is very hot. Patient has the chills. She does have a cough. She has no appetite. Patient states her children have had the flu recently. Patient would like tamiflu. Please advise.

## 2018-01-23 ENCOUNTER — TELEPHONE (OUTPATIENT)
Dept: OBSTETRICS AND GYNECOLOGY | Facility: CLINIC | Age: 38
End: 2018-01-23

## 2018-01-23 NOTE — TELEPHONE ENCOUNTER
----- Message from Gladys Kitchen sent at 1/23/2018  2:02 PM CST -----  Contact: self / [phone]  Patient is requesting a call back she missed her appointment last week and would like to come in ASAP. Please advise

## 2018-01-23 NOTE — TELEPHONE ENCOUNTER
Returned pt's call. Pt states she is feeling much better and would like to reschedule her appt she missed due to having the flu. Pt scheduled for 01/26/18 at 10:00am. Patient verbalized understanding.

## 2018-01-24 ENCOUNTER — TELEPHONE (OUTPATIENT)
Dept: OBSTETRICS AND GYNECOLOGY | Facility: CLINIC | Age: 38
End: 2018-01-24

## 2018-01-24 NOTE — TELEPHONE ENCOUNTER
Contacted patient to see if she can come in earlier for her appt Friday since Dr. Zamarripa has a meeting to go to. Patient did not answer. Left message to return call.

## 2018-01-24 NOTE — TELEPHONE ENCOUNTER
----- Message from Avani Phan sent at 1/24/2018  1:52 PM CST -----  Contact: [phone]/self  Patient is returning your call. Please advise.

## 2018-01-26 ENCOUNTER — ROUTINE PRENATAL (OUTPATIENT)
Dept: OBSTETRICS AND GYNECOLOGY | Facility: CLINIC | Age: 38
End: 2018-01-26
Payer: MEDICAID

## 2018-01-26 VITALS
WEIGHT: 174.63 LBS | BODY MASS INDEX: 27.35 KG/M2 | DIASTOLIC BLOOD PRESSURE: 68 MMHG | SYSTOLIC BLOOD PRESSURE: 122 MMHG

## 2018-01-26 DIAGNOSIS — O09.32 LATE PRENATAL CARE AFFECTING PREGNANCY IN SECOND TRIMESTER: Primary | ICD-10-CM

## 2018-01-26 DIAGNOSIS — Z3A.39 39 WEEKS GESTATION OF PREGNANCY: ICD-10-CM

## 2018-01-26 PROCEDURE — 99213 OFFICE O/P EST LOW 20 MIN: CPT | Mod: TH,S$PBB,, | Performed by: OBSTETRICS & GYNECOLOGY

## 2018-01-26 PROCEDURE — 99999 PR PBB SHADOW E&M-EST. PATIENT-LVL III: CPT | Mod: PBBFAC,,, | Performed by: OBSTETRICS & GYNECOLOGY

## 2018-01-26 PROCEDURE — 99213 OFFICE O/P EST LOW 20 MIN: CPT | Mod: PBBFAC,TH,PO | Performed by: OBSTETRICS & GYNECOLOGY

## 2018-01-26 RX ORDER — ONDANSETRON 4 MG/1
8 TABLET, ORALLY DISINTEGRATING ORAL EVERY 8 HOURS PRN
Status: CANCELLED | OUTPATIENT
Start: 2018-01-26

## 2018-01-26 RX ORDER — SODIUM CHLORIDE, SODIUM LACTATE, POTASSIUM CHLORIDE, CALCIUM CHLORIDE 600; 310; 30; 20 MG/100ML; MG/100ML; MG/100ML; MG/100ML
INJECTION, SOLUTION INTRAVENOUS CONTINUOUS
Status: CANCELLED | OUTPATIENT
Start: 2018-01-26

## 2018-01-26 RX ORDER — SODIUM CHLORIDE 9 MG/ML
INJECTION, SOLUTION INTRAVENOUS
Status: CANCELLED | OUTPATIENT
Start: 2018-01-26

## 2018-01-26 RX ORDER — MISOPROSTOL 100 UG/1
600 TABLET ORAL
Status: CANCELLED | OUTPATIENT
Start: 2018-01-26

## 2018-01-27 ENCOUNTER — PATIENT MESSAGE (OUTPATIENT)
Dept: OBSTETRICS AND GYNECOLOGY | Facility: CLINIC | Age: 38
End: 2018-01-27

## 2018-01-29 ENCOUNTER — TELEPHONE (OUTPATIENT)
Dept: OBSTETRICS AND GYNECOLOGY | Facility: CLINIC | Age: 38
End: 2018-01-29

## 2018-01-29 ENCOUNTER — HOSPITAL ENCOUNTER (INPATIENT)
Facility: HOSPITAL | Age: 38
LOS: 3 days | Discharge: HOME | End: 2018-02-01
Attending: OBSTETRICS & GYNECOLOGY | Admitting: OBSTETRICS & GYNECOLOGY
Payer: MEDICAID

## 2018-01-29 DIAGNOSIS — Z3A.39 39 WEEKS GESTATION OF PREGNANCY: ICD-10-CM

## 2018-01-29 PROBLEM — O47.9 UTERINE CONTRACTIONS DURING PREGNANCY: Status: ACTIVE | Noted: 2018-01-29

## 2018-01-29 PROCEDURE — 11000001 HC ACUTE MED/SURG PRIVATE ROOM

## 2018-01-29 RX ORDER — MISOPROSTOL 200 UG/1
600 TABLET ORAL
Status: DISCONTINUED | OUTPATIENT
Start: 2018-01-29 | End: 2018-01-30

## 2018-01-29 NOTE — TELEPHONE ENCOUNTER
Returned pt's call. Pt states she went to the restroom and in her underwear there is a big dark glob of blood. Pt states theres is no other color in the discharge besides dark red blood and she is not experiencing contractions. Advised pt to head back to L&D where we will stay for induction tomorrow. Patient verbalized understanding.

## 2018-01-29 NOTE — TELEPHONE ENCOUNTER
Returned pt's call. Pt states the contractions are stronger but still far apart. Pt states she hasn't time them since they started yesterday. Pt is headed to L&D. Patient verbalized understanding.

## 2018-01-29 NOTE — TELEPHONE ENCOUNTER
----- Message from Lisa Ortiz sent at 1/29/2018  3:51 PM CST -----  Contact: [phone]/ self   Patient called in requesting to speak with you. Patient prefers to speak with a nurse. Please advise.

## 2018-01-30 ENCOUNTER — TELEPHONE (OUTPATIENT)
Dept: OBSTETRICS AND GYNECOLOGY | Facility: CLINIC | Age: 38
End: 2018-01-30

## 2018-01-30 ENCOUNTER — ANESTHESIA EVENT (OUTPATIENT)
Dept: OBSTETRICS AND GYNECOLOGY | Facility: HOSPITAL | Age: 38
End: 2018-01-30
Payer: MEDICAID

## 2018-01-30 ENCOUNTER — ANESTHESIA (OUTPATIENT)
Dept: OBSTETRICS AND GYNECOLOGY | Facility: HOSPITAL | Age: 38
End: 2018-01-30
Payer: MEDICAID

## 2018-01-30 LAB
ABO + RH BLD: NORMAL
ANISOCYTOSIS BLD QL SMEAR: SLIGHT
BASOPHILS # BLD AUTO: ABNORMAL K/UL
BASOPHILS NFR BLD: 0 %
BLD GP AB SCN CELLS X3 SERPL QL: NORMAL
DIFFERENTIAL METHOD: ABNORMAL
EOSINOPHIL # BLD AUTO: ABNORMAL K/UL
EOSINOPHIL NFR BLD: 0 %
ERYTHROCYTE [DISTWIDTH] IN BLOOD BY AUTOMATED COUNT: 14.9 %
HCT VFR BLD AUTO: 31.4 %
HGB BLD-MCNC: 10.3 G/DL
LYMPHOCYTES # BLD AUTO: ABNORMAL K/UL
LYMPHOCYTES NFR BLD: 8 %
MCH RBC QN AUTO: 28.9 PG
MCHC RBC AUTO-ENTMCNC: 32.8 G/DL
MCV RBC AUTO: 88 FL
MONOCYTES # BLD AUTO: ABNORMAL K/UL
MONOCYTES NFR BLD: 6 %
NEUTROPHILS NFR BLD: 85 %
NEUTS BAND NFR BLD MANUAL: 1 %
PLATELET # BLD AUTO: 282 K/UL
PLATELET BLD QL SMEAR: ABNORMAL
PMV BLD AUTO: 10.2 FL
RBC # BLD AUTO: 3.57 M/UL
WBC # BLD AUTO: 13.02 K/UL

## 2018-01-30 PROCEDURE — 72100002 HC LABOR CARE, 1ST 8 HOURS

## 2018-01-30 PROCEDURE — 72200005 HC VAGINAL DELIVERY LEVEL II

## 2018-01-30 PROCEDURE — 36415 COLL VENOUS BLD VENIPUNCTURE: CPT

## 2018-01-30 PROCEDURE — 10907ZC DRAINAGE OF AMNIOTIC FLUID, THERAPEUTIC FROM PRODUCTS OF CONCEPTION, VIA NATURAL OR ARTIFICIAL OPENING: ICD-10-PCS | Performed by: OBSTETRICS & GYNECOLOGY

## 2018-01-30 PROCEDURE — 51702 INSERT TEMP BLADDER CATH: CPT

## 2018-01-30 PROCEDURE — 59409 OBSTETRICAL CARE: CPT | Mod: GB,,, | Performed by: OBSTETRICS & GYNECOLOGY

## 2018-01-30 PROCEDURE — 72100003 HC LABOR CARE, EA. ADDL. 8 HRS

## 2018-01-30 PROCEDURE — 62326 NJX INTERLAMINAR LMBR/SAC: CPT | Performed by: ANESTHESIOLOGY

## 2018-01-30 PROCEDURE — 27800516 HC TRAY, EPIDURAL COMBO: Performed by: ANESTHESIOLOGY

## 2018-01-30 PROCEDURE — 3E033VJ INTRODUCTION OF OTHER HORMONE INTO PERIPHERAL VEIN, PERCUTANEOUS APPROACH: ICD-10-PCS | Performed by: OBSTETRICS & GYNECOLOGY

## 2018-01-30 PROCEDURE — 85007 BL SMEAR W/DIFF WBC COUNT: CPT

## 2018-01-30 PROCEDURE — 11000001 HC ACUTE MED/SURG PRIVATE ROOM

## 2018-01-30 PROCEDURE — 25000003 PHARM REV CODE 250: Performed by: OBSTETRICS & GYNECOLOGY

## 2018-01-30 PROCEDURE — 86901 BLOOD TYPING SEROLOGIC RH(D): CPT

## 2018-01-30 PROCEDURE — 27200710 HC EPIDURAL INFUSION PUMP SET: Performed by: ANESTHESIOLOGY

## 2018-01-30 PROCEDURE — 85027 COMPLETE CBC AUTOMATED: CPT

## 2018-01-30 RX ORDER — IBUPROFEN 600 MG/1
600 TABLET ORAL EVERY 6 HOURS PRN
Status: DISCONTINUED | OUTPATIENT
Start: 2018-01-30 | End: 2018-02-01 | Stop reason: HOSPADM

## 2018-01-30 RX ORDER — DOCUSATE SODIUM 100 MG/1
200 CAPSULE, LIQUID FILLED ORAL 2 TIMES DAILY PRN
Status: DISCONTINUED | OUTPATIENT
Start: 2018-01-30 | End: 2018-02-01 | Stop reason: HOSPADM

## 2018-01-30 RX ORDER — FAMOTIDINE 10 MG/ML
20 INJECTION INTRAVENOUS ONCE
Status: DISCONTINUED | OUTPATIENT
Start: 2018-01-30 | End: 2018-01-30

## 2018-01-30 RX ORDER — OXYTOCIN/RINGER'S LACTATE 20/1000 ML
2 PLASTIC BAG, INJECTION (ML) INTRAVENOUS CONTINUOUS
Status: DISCONTINUED | OUTPATIENT
Start: 2018-01-30 | End: 2018-01-30

## 2018-01-30 RX ORDER — OXYCODONE AND ACETAMINOPHEN 5; 325 MG/1; MG/1
1 TABLET ORAL EVERY 4 HOURS PRN
Status: DISCONTINUED | OUTPATIENT
Start: 2018-01-30 | End: 2018-02-01 | Stop reason: HOSPADM

## 2018-01-30 RX ORDER — SODIUM CHLORIDE 9 MG/ML
INJECTION, SOLUTION INTRAVENOUS
Status: DISCONTINUED | OUTPATIENT
Start: 2018-01-30 | End: 2018-01-30

## 2018-01-30 RX ORDER — ACETAMINOPHEN 325 MG/1
650 TABLET ORAL EVERY 6 HOURS PRN
Status: DISCONTINUED | OUTPATIENT
Start: 2018-01-30 | End: 2018-02-01 | Stop reason: HOSPADM

## 2018-01-30 RX ORDER — METOCLOPRAMIDE HYDROCHLORIDE 5 MG/ML
10 INJECTION INTRAMUSCULAR; INTRAVENOUS ONCE
Status: DISCONTINUED | OUTPATIENT
Start: 2018-01-30 | End: 2018-01-30

## 2018-01-30 RX ORDER — SODIUM CHLORIDE, SODIUM LACTATE, POTASSIUM CHLORIDE, CALCIUM CHLORIDE 600; 310; 30; 20 MG/100ML; MG/100ML; MG/100ML; MG/100ML
INJECTION, SOLUTION INTRAVENOUS CONTINUOUS
Status: DISCONTINUED | OUTPATIENT
Start: 2018-01-30 | End: 2018-01-30

## 2018-01-30 RX ORDER — ESCITALOPRAM OXALATE 10 MG/1
20 TABLET ORAL NIGHTLY
Status: DISCONTINUED | OUTPATIENT
Start: 2018-01-30 | End: 2018-02-01 | Stop reason: HOSPADM

## 2018-01-30 RX ORDER — DIPHENHYDRAMINE HCL 25 MG
25 CAPSULE ORAL EVERY 4 HOURS PRN
Status: DISCONTINUED | OUTPATIENT
Start: 2018-01-30 | End: 2018-02-01 | Stop reason: HOSPADM

## 2018-01-30 RX ORDER — ONDANSETRON 8 MG/1
8 TABLET, ORALLY DISINTEGRATING ORAL EVERY 8 HOURS PRN
Status: DISCONTINUED | OUTPATIENT
Start: 2018-01-30 | End: 2018-02-01 | Stop reason: HOSPADM

## 2018-01-30 RX ORDER — DIPHENHYDRAMINE HYDROCHLORIDE 50 MG/ML
12.5 INJECTION INTRAMUSCULAR; INTRAVENOUS EVERY 4 HOURS PRN
Status: DISCONTINUED | OUTPATIENT
Start: 2018-01-30 | End: 2018-01-30

## 2018-01-30 RX ORDER — ONDANSETRON 8 MG/1
8 TABLET, ORALLY DISINTEGRATING ORAL EVERY 8 HOURS PRN
Status: DISCONTINUED | OUTPATIENT
Start: 2018-01-30 | End: 2018-01-30

## 2018-01-30 RX ORDER — SODIUM CITRATE AND CITRIC ACID MONOHYDRATE 334; 500 MG/5ML; MG/5ML
30 SOLUTION ORAL ONCE
Status: DISCONTINUED | OUTPATIENT
Start: 2018-01-30 | End: 2018-01-30

## 2018-01-30 RX ORDER — OXYTOCIN/RINGER'S LACTATE 20/1000 ML
PLASTIC BAG, INJECTION (ML) INTRAVENOUS
Status: DISPENSED
Start: 2018-01-30 | End: 2018-01-30

## 2018-01-30 RX ADMIN — ESCITALOPRAM 20 MG: 10 TABLET, FILM COATED ORAL at 08:01

## 2018-01-30 RX ADMIN — IBUPROFEN 600 MG: 600 TABLET, FILM COATED ORAL at 05:01

## 2018-01-30 RX ADMIN — OXYCODONE AND ACETAMINOPHEN 1 TABLET: 5; 325 TABLET ORAL at 05:01

## 2018-01-30 RX ADMIN — SODIUM CHLORIDE, SODIUM LACTATE, POTASSIUM CHLORIDE, AND CALCIUM CHLORIDE: .6; .31; .03; .02 INJECTION, SOLUTION INTRAVENOUS at 08:01

## 2018-01-30 RX ADMIN — OXYCODONE AND ACETAMINOPHEN 1 TABLET: 5; 325 TABLET ORAL at 10:01

## 2018-01-30 NOTE — ANESTHESIA PROCEDURE NOTES
CSE    Patient location during procedure: OB  Start time: 1/30/2018 7:48 AM  Timeout: 1/30/2018 7:46 AM  End time: 1/30/2018 8:14 AM  Staffing  Anesthesiologist: EARLINE BOTELLO  Performed: anesthesiologist   Preanesthetic Checklist  Completed: patient identified, site marked, surgical consent, pre-op evaluation, timeout performed, IV checked, risks and benefits discussed and monitors and equipment checked  CSE  Patient position: sitting  Prep: ChloraPrep  Patient monitoring: heart rate, cardiac monitor, continuous pulse ox and frequent blood pressure checks  Approach: midline  Spinal Needle  Needle type: Constantine   Needle gauge: 25 G  Needle length: 5 in  Epidural Needle  Injection technique: PATRICK air  Needle type: Tuohy   Needle gauge: 17 G  Needle length: 3.5 in  Needle insertion depth: 6 cm  Location: L3-4  Needle localization: anatomical landmarks  Catheter  Catheter type: side hole  Catheter size: 20 G  Catheter at skin depth: 13 cm  Test dose: negative  Additional Documentation: negative aspiration for CSF, negative aspiration for heme, no paresthesia on injection and negative test dose  Assessment  Sensory level: T6   Dermatomal levels determined by pinch or prick  Intrathecal Medications:  Bolus administered: 1.5 mL of 0.2 ropivacaine  administered: primary anesthetic and 6 mcg of  fentanyl  Epinephrine added: none

## 2018-01-30 NOTE — ANESTHESIA PREPROCEDURE EVALUATION
2018  Kary Magaña is a 37 y.o., female para 8, here in labor at term, desiring epidural for pain.  No previous sign history.  PSH lower leg reconstrustion at 13 yrs old.  [facility]  Anesthesia Evaluation    I have reviewed the Patient Summary Reports.     I have reviewed the Medications.     Review of Systems  Anesthesia Hx:  No problems with previous Anesthesia   Denies Personal Hx of Anesthesia complications.   Social:  Non-Smoker, No Alcohol Use    Cardiovascular:  Cardiovascular Normal Exercise tolerance: good     Pulmonary:  Pulmonary Normal    Hepatic/GI:  Hepatic/GI Normal    Musculoskeletal:  Musculoskeletal Normal    OB/GYN/PEDS:   at term.   Neurological:  Neurology Normal        Physical Exam  General:  Well nourished    Airway/Jaw/Neck:  Airway Findings: Mouth Opening: Normal Tongue: Normal  General Airway Assessment: Adult  Mallampati: II  TM Distance: Normal, at least 6 cm  Jaw/Neck Findings:     Neck ROM: Normal ROM      Dental:  Dental Findings: In tact   Chest/Lungs:  Chest/Lungs Findings: Clear to auscultation, Normal Respiratory Rate     Heart/Vascular:  Heart Findings: Rate: Normal  Rhythm: Regular Rhythm  Sounds: Normal        Mental Status:  Mental Status Findings:  Cooperative, Alert and Oriented         Anesthesia Plan  Type of Anesthesia, risks & benefits discussed:  Anesthesia Type:  CSE, general, epidural  Patient's Preference: CSE  Intra-op Monitoring Plan:   Intra-op Monitoring Plan Comments:   Post Op Pain Control Plan:   Post Op Pain Control Plan Comments:   Induction:    Beta Blocker:  Patient is not currently on a Beta-Blocker (No further documentation required).       Informed Consent: Patient understands risks and agrees with Anesthesia plan.  Questions answered. Anesthesia consent signed with patient.  ASA Score: 2  emergent   Day of Surgery Review of History &  Physical:    H&P update referred to the provider.         Ready For Surgery From Anesthesia Perspective.

## 2018-01-30 NOTE — L&D DELIVERY NOTE
[facility]  Vaginal Delivery   Operative Note    SUMMARY     Normal spontaneous vaginal delivery of live infant, was placed on mothers abdomen for skin to skin and bulb suctioning performed.  Infant delivered position MAX over intact perineum.  Nuchal cord: No.    Spontaneous delivery of placenta and IV pitocin given noting good uterine tone.  No lacerations noted.  Patient tolerated delivery well. Sponge needle and lap counted correctly x2.    Indications: 39 weeks gestation of pregnancy  Pregnancy complicated by:   Patient Active Problem List   Diagnosis    Hyperlipidemia    Late prenatal care affecting pregnancy in second trimester- first visit at 27 weeks    Sickle cell trait    Grand multiparity    Elderly multigravida in third trimester    Anxiety- on lexapro    Submucous leiomyoma of uterus    Uterine contractions during pregnancy    39 weeks gestation of pregnancy     (spontaneous vaginal delivery)     Admitting GA: 39w2d    Delivery Information for  Mac Magaña    Birth information:  YOB: 2018   Time of birth: 12:07 PM   Sex: male   Head Delivery Date/Time: 2018 12:07 PM   Delivery type: Vaginal, Spontaneous Delivery   Gestational Age: 39w2d    Delivery Providers    Delivering clinician:  Shruthi Zamarripa MD   Other personnel:   Provider Role   Kaycee Smith RN Registered Nurse   Dianne Gudino, RN Registered Nurse                   [place] Assessment    Living status:  Living  Apgars:     1 Minute:   5 Minute:   10 Minute:   15 Minute:   20 Minute:     Skin Color:   1  1       Heart Rate:   2  2       Reflex Irritability:   2  2       Muscle Tone:   2  2       Respiratory Effort:   2  2       Total:   9  9               Apgars Assigned By:  DIANNE GUDINO         Assisted Delivery Details:    Forceps attempted?:  No  Vacuum extractor attempted?:  No         Shoulder Dystocia    Shoulder dystocia present?:  No           Presentation and Position     Presentation:   Vertex   Position:   Middle    Occiput    Anterior            Interventions/Resuscitation    Method:  Bulb Suctioning, Tactile Stimulation       Cord    Vessels:  3 vessels  Complications:  None  Delayed Cord Clamping?:  Yes  Cord Clamped Date/Time:  2018 12:10 PM  Cord Blood Disposition:  Sent with Baby  Gases Sent?:  No  Stem Cell Collection (by MD):  No       Placenta    Date and time:  2018 12:10 PM  Removal:  Spontaneous  Appearance:  Intact  Placenta disposition:  discarded           Labor Events:       labor: No     Labor Onset Date/Time:         Dilation Complete Date/Time: 2018 11:55     Start Pushing Date/Time:       Rupture Date/Time:              Rupture type:           Fluid Amount:        Fluid Color:        Fluid Odor:        Membrane Status (PeriCalm): ARM (Artificial Rupture)      Rupture Date/Time (PeriCalm): 2018 09:50:00      Fluid Amount (PeriCalm): Small      Fluid Color (PeriCalm): Bloody       steroids: None     Antibiotics given for GBS: No     Induction: oxytocin     Indications for induction:  Elective     Augmentation: amniotomy     Indications for augmentation:       Labor complications: None     Additional complications:          Cervical ripening:                     Delivery:      Episiotomy: None     Indication for Episiotomy:       Perineal Lacerations: None Repaired:      Periurethral Laceration: none Repaired:     Labial Laceration: none Repaired:     Sulcus Laceration: none Repaired:     Vaginal Laceration: No Repaired:     Cervical Laceration: No Repaired:     Repair suture: None     Repair # of packets:       Vaginal delivery QBL (mL): 125      QBL (mL): 0     Combined Blood Loss (mL): 125     Vaginal Sweep Performed: Yes     Surgicount Correct:         Other providers:       Anesthesia    Method:  Spinal, Epidural          Details (if applicable):  Trial of Labor      Categorization:       Priority:     Indications for :     Incision Type:       Additional  information:  Forceps:    Vacuum:    Breech:    Observed anomalies    Other (Comments):           Shruhti Zamarripa MD, FACOG  OB/GYN  Pager: [phone]

## 2018-01-30 NOTE — H&P
[facility]  Obstetrics  History & Physical    Patient Name: Kary Magaña  MRN: [id number]  Admission Date: 2018  Primary Care Provider: Primary Doctor No    Subjective:     Principal Problem:39 weeks gestation of pregnancy    Obstetric HPI:   Kary Magaña is a 37 y.o.  female with IUP at 39w2d weeks gestation by who presents for scheduled induction. This IUP is complicated by late prenatal care.  Patient reports contractions, denies vaginal bleeding, denies LOF.   Fetal Movement: normal.       Obstetric History       T7      L8     SAB1   TAB0   Ectopic0   Multiple0   Live Births8       # Outcome Date GA Lbr Juan/2nd Weight Sex Delivery Anes PTL Lv   10 Current            9 Term 01/13/15 39w0d  3.487 kg (7 lb 11 oz) F Vag-Spont   RL   8 Term 11 37w0d  3.487 kg (7 lb 11 oz) M Vag-Spont   RL   7 Term 10/03/08 38w0d  3.487 kg (7 lb 11 oz) M Vag-Spont   RL   6 Term 06 37w0d  3.487 kg (7 lb 11 oz) M Vag-Spont   RL   5  04 36w0d  3.033 kg (6 lb 11 oz) M Vag-Spont   RL   4 Term 03 38w0d  3.487 kg (7 lb 11 oz) M Vag-Spont   RL   3 Term 07/15/99 37w5d  3.487 kg (7 lb 11 oz) M Vag-Spont   RL   2 Term 97 37w0d  3.827 kg (8 lb 7 oz) M Vag-Spont   RL   1 SAB  4w0d               Past Medical History:   Diagnosis Date    Sickle cell trait      No past surgical history on file.    PTA Medications   Medication Sig    docusate sodium 100 mg capsule Take 100 mg by mouth 2 (two) times daily as needed for Constipation.    escitalopram oxalate (LEXAPRO) 20 MG tablet Take 40 mg by mouth once daily.    ferrous sulfate 325 (65 FE) MG EC tablet Take 1 tablet (325 mg total) by mouth 2 (two) times daily.    MIRENA 20 mcg/24 hr (5 years) IUD TO BE INSERTED ONE TIME BY PRESCRIBER. ROUTE INTRAUTERINE.    ondansetron (ZOFRAN-ODT) 4 MG TbDL Take 1 tablet (4 mg total) by mouth every 8 (eight) hours as needed (nausea).    prenatal 21-iron fu-folic  acid (PRENATAL COMPLETE) 14 mg iron- 400 mcg Tab Take 1 tablet by mouth once daily.    PRENATAL PLUS, CALCIUM CARB, 27 mg iron- 1 mg Tab Take 1 tablet by mouth once daily.       Review of patient's allergies indicates:  No Known Allergies     Family History     None        Social History Main Topics    Smoking status: Never Smoker    Smokeless tobacco: Never Used    Alcohol use No      Comment: socially prior to pregnancy    Drug use: No    Sexual activity: Yes     Partners: Male     Review of Systems   Objective:     Vital Signs (Most Recent):  Pulse: 95 (01/30/18 0430)  BP: 110/68 (01/30/18 0430)  SpO2: 100 % (01/30/18 0430) Vital Signs (24h Range):  Temp:  [98.5 °F (36.9 °C)] 98.5 °F (36.9 °C)  Pulse:  [] 95  Resp:  [20] 20  SpO2:  [92 %-100 %] 100 %  BP: (110-147)/(57-86) 110/68     Weight: 66.7 kg (147 lb)  Body mass index is 23.02 kg/m².    FHT: 130, mod BTBV, +accels, Cat 1 (reassuring)  TOCO: Q 3 minutes    Physical Exam:   Constitutional: She is oriented to person, place, and time. She appears well-developed and well-nourished. No distress.    HENT:   Head: Normocephalic and atraumatic.      Cardiovascular: Normal rate, regular rhythm and normal heart sounds.     Pulmonary/Chest: Effort normal and breath sounds normal. She has no wheezes. She has no rales.        Abdominal: Soft. Bowel sounds are normal.   gravid             Musculoskeletal: Moves all extremeties. She exhibits no edema.       Neurological: She is alert and oriented to person, place, and time. She has normal reflexes.    Skin: Skin is warm and dry.    Psychiatric: She has a normal mood and affect.       Cervix:  1.5/60/-3       Significant Labs:  Lab Results   Component Value Date    GROUPTRH A POS 01/30/2018    HEPBSAG Negative 12/12/2017    STREPBCULT No Group B Streptococcus isolated 01/10/2018     Lab Results   Component Value Date    WBC 13.02 (H) 01/30/2018    HGB 10.3 (L) 01/30/2018    HCT 31.4 (L) 01/30/2018    MCV 88  2018     2018       Assessment/Plan:     37 y.o. female  at 39w2d for:    Active Diagnoses:    Diagnosis Date Noted POA    PRINCIPAL PROBLEM:  39 weeks gestation of pregnancy [Z3A.39] 2018 Not Applicable    Sickle cell trait [D57.3] 2017 Yes    Grand multiparity [Z64.1] 2017 Not Applicable    Elderly multigravida in third trimester [O09.523] 2017 Yes    Anxiety- on lexapro [F41.9] 2017 Yes      Problems Resolved During this Admission:    Diagnosis Date Noted Date Resolved POA       - Admit to L&D  - Consents reviewed   - Epidural per Anesthesia  - Recheck in 2 hrs or PRN  - CBC, type and screen reviewed    Shruthi Zamarripa MD  Obstetrics  [facility]

## 2018-01-30 NOTE — TELEPHONE ENCOUNTER
I will see pt in the morning and discharge as long as the nursery feels like they can discharge her son. I cant due to circumcision though until they clear him for that    Shruthi Zamarripa MD, FACOG  OB/GYN  Pager: [phone]

## 2018-01-30 NOTE — TELEPHONE ENCOUNTER
----- Message from Batsheva Houston sent at 1/30/2018  3:31 PM CST -----  Contact: Self [phone]  Patient is calling to talk to nurse concerning her discharge from the hospital. please advice

## 2018-01-30 NOTE — PLAN OF CARE
183 Pt arrives from home  one sab and 8 , was at hospital earlier today for mild ctx, Pt came back b/c she was having bloody show. SVE /-3, bloody show noted on glove. EFm and TOCO applied. Plan of care reviewed.      Dr Zamarripa updated on pt arrival, SVe  No change with bloody show. Pt will be admitted to start Pitocin at 0400.     1900 Report given to ABHISHEK Pérez RN

## 2018-01-30 NOTE — TELEPHONE ENCOUNTER
Returned pt's call. Pt states she wants to know if she can be discharged before the 48hr period she has to stay in the hospital due to her having other kids.

## 2018-01-31 LAB
BASOPHILS # BLD AUTO: 0.02 K/UL
BASOPHILS NFR BLD: 0.1 %
DIFFERENTIAL METHOD: ABNORMAL
EOSINOPHIL # BLD AUTO: 0.3 K/UL
EOSINOPHIL NFR BLD: 1.6 %
ERYTHROCYTE [DISTWIDTH] IN BLOOD BY AUTOMATED COUNT: 15.1 %
HCT VFR BLD AUTO: 30.1 %
HGB BLD-MCNC: 9.7 G/DL
LYMPHOCYTES # BLD AUTO: 2.3 K/UL
LYMPHOCYTES NFR BLD: 14.3 %
MCH RBC QN AUTO: 28.8 PG
MCHC RBC AUTO-ENTMCNC: 32.2 G/DL
MCV RBC AUTO: 89 FL
MONOCYTES # BLD AUTO: 1.5 K/UL
MONOCYTES NFR BLD: 9.1 %
NEUTROPHILS # BLD AUTO: 11.8 K/UL
NEUTROPHILS NFR BLD: 73.7 %
PLATELET # BLD AUTO: 265 K/UL
PMV BLD AUTO: 10.6 FL
RBC # BLD AUTO: 3.37 M/UL
WBC # BLD AUTO: 15.99 K/UL

## 2018-01-31 PROCEDURE — 99231 SBSQ HOSP IP/OBS SF/LOW 25: CPT | Mod: ,,, | Performed by: OBSTETRICS & GYNECOLOGY

## 2018-01-31 PROCEDURE — 11000001 HC ACUTE MED/SURG PRIVATE ROOM

## 2018-01-31 PROCEDURE — 25000003 PHARM REV CODE 250: Performed by: OBSTETRICS & GYNECOLOGY

## 2018-01-31 PROCEDURE — 85025 COMPLETE CBC W/AUTO DIFF WBC: CPT

## 2018-01-31 PROCEDURE — 36415 COLL VENOUS BLD VENIPUNCTURE: CPT

## 2018-01-31 RX ORDER — OXYCODONE AND ACETAMINOPHEN 5; 325 MG/1; MG/1
1 TABLET ORAL EVERY 8 HOURS PRN
Qty: 15 TABLET | Refills: 0 | Status: SHIPPED | OUTPATIENT
Start: 2018-01-31 | End: 2018-03-09

## 2018-01-31 RX ORDER — IBUPROFEN 600 MG/1
600 TABLET ORAL EVERY 6 HOURS PRN
Qty: 30 TABLET | Refills: 0 | Status: SHIPPED | OUTPATIENT
Start: 2018-01-31 | End: 2018-02-05 | Stop reason: SDUPTHER

## 2018-01-31 RX ADMIN — ESCITALOPRAM 20 MG: 10 TABLET, FILM COATED ORAL at 08:01

## 2018-01-31 RX ADMIN — IBUPROFEN 600 MG: 600 TABLET, FILM COATED ORAL at 10:01

## 2018-01-31 RX ADMIN — IBUPROFEN 600 MG: 600 TABLET, FILM COATED ORAL at 12:01

## 2018-01-31 RX ADMIN — OXYCODONE AND ACETAMINOPHEN 1 TABLET: 5; 325 TABLET ORAL at 07:01

## 2018-01-31 RX ADMIN — OXYCODONE AND ACETAMINOPHEN 1 TABLET: 5; 325 TABLET ORAL at 10:01

## 2018-01-31 RX ADMIN — IBUPROFEN 600 MG: 600 TABLET, FILM COATED ORAL at 07:01

## 2018-01-31 RX ADMIN — OXYCODONE AND ACETAMINOPHEN 1 TABLET: 5; 325 TABLET ORAL at 02:01

## 2018-01-31 RX ADMIN — OXYCODONE AND ACETAMINOPHEN 1 TABLET: 5; 325 TABLET ORAL at 05:01

## 2018-01-31 NOTE — PROGRESS NOTES
[facility]  Obstetrics  Postpartum Progress Note    Patient Name: Kary Magaña  MRN: [id number]  Admission Date: 2018  Hospital Length of Stay: 2 days  Attending Physician: Shruthi Zamarripa MD  Primary Care Provider: Primary Doctor No    Subjective:     Principal Problem:39 weeks gestation of pregnancy    Hospital course: Pt admitted for induction of labor at 39w1d. PPD#1 s/p     Interval History:   She is doing well this morning. She is tolerating a regular diet without nausea or vomiting. She is voiding spontaneously. She is ambulating. Vaginal bleeding is mild. She denies fever or chills. Abdominal pain is mild and controlled with oral medications. She is breastfeeding. She desires circumcision for her male baby: yes.     Objective:     Vital Signs (Most Recent):  Temp: 97.9 °F (36.6 °C) (18 0800)  Pulse: 90 (18 0800)  Resp: 18 (18 0800)  BP: 123/75 (18 0800)  SpO2: 100 % (18 1445) Vital Signs (24h Range):  Temp:  [97.9 °F (36.6 °C)-98.5 °F (36.9 °C)] 97.9 °F (36.6 °C)  Pulse:  [] 90  Resp:  [18] 18  SpO2:  [79 %-100 %] 100 %  BP: (114-138)/(57-85) 123/75     Weight: 66.7 kg (147 lb)  Body mass index is 23.02 kg/m².      Intake/Output Summary (Last 24 hours) at 18 1014  Last data filed at 18 1207   Gross per 24 hour   Intake                0 ml   Output              725 ml   Net             -725 ml       Physical Exam:   Constitutional: She is oriented to person, place, and time. She appears well-developed and well-nourished. No distress.    HENT:   Head: Normocephalic and atraumatic.      Cardiovascular: Normal rate, regular rhythm and normal heart sounds.     Pulmonary/Chest: Effort normal and breath sounds normal. She has no wheezes. She has no rales.        Abdominal: Soft. Bowel sounds are normal.   Fundus firm             Musculoskeletal: Moves all extremeties. She exhibits no edema.       Neurological: She is alert and oriented to  person, place, and time. She has normal reflexes.    Skin: Skin is warm and dry.    Psychiatric: She has a normal mood and affect.       Significant Labs:  Lab Results   Component Value Date    GROUPTRH A POS 2018    HEPBSAG Negative 2017    STREPBCULT No Group B Streptococcus isolated 01/10/2018       Recent Labs  Lab 18  0500   HGB 9.7*   HCT 30.1*       Assessment/Plan:     37 y.o. female  at 39w2d for:    Active Diagnoses:    Diagnosis Date Noted POA    PRINCIPAL PROBLEM:  39 weeks gestation of pregnancy [Z3A.39] 2018 Not Applicable     (spontaneous vaginal delivery) [O80] 2018 Not Applicable    Sickle cell trait [D57.3] 2017 Yes    Grand multiparity [Z64.1] 2017 Not Applicable    Elderly multigravida in third trimester [O09.523] 2017 Yes    Anxiety- on lexapro [F41.9] 2017 Yes      Problems Resolved During this Admission:    Diagnosis Date Noted Date Resolved POA       1. Continue routine postpartum care. Pain meds prn. Male for circ if/when cleared. As of now NP states infant is not cleared because of only 1 void since delivery. Continue lexapro for anxiety.     Disposition: As patient meets milestones, will plan to discharge PPD#2 in AM. Rx sent to pharmacy for bedside delivery.    Shruthi Zamarripa MD  Obstetrics  [facility]

## 2018-01-31 NOTE — ANESTHESIA POSTPROCEDURE EVALUATION
"Anesthesia Post Evaluation    Patient: Kary Magaña    Procedure(s) Performed: * No procedures listed *    Final Anesthesia Type: CSE  Patient location during evaluation: floor  Patient participation: Yes- Able to Participate  Level of consciousness: awake and alert and oriented  Post-procedure vital signs: reviewed and stable  Pain management: adequate  Airway patency: patent  PONV status at discharge: No PONV  Anesthetic complications: no      Cardiovascular status: blood pressure returned to baseline and hemodynamically stable  Respiratory status: unassisted, spontaneous ventilation and room air  Hydration status: euvolemic  Follow-up not needed.        Visit Vitals  /85 (BP Location: Left arm, Patient Position: Sitting)   Pulse 86   Temp 36.6 °C (97.9 °F) (Oral)   Resp 18   Ht 5' 7" (1.702 m)   Wt 66.7 kg (147 lb)   LMP 07/05/2017   SpO2 100%   Breastfeeding? Unknown   BMI 23.02 kg/m²       Pain/Remi Score: Pain Rating Prior to Med Admin: 2 (1/31/2018  5:00 AM)      "

## 2018-01-31 NOTE — PLAN OF CARE
Problem: Patient Care Overview  Goal: Plan of Care Review  Outcome: Ongoing (interventions implemented as appropriate)  Pain controlled.  Remains free from infection, handwashing encouraged.  Tolerating diet, voiding, and ambulating.  Safety maintained.  Breastfeeding encouraged, support offered.

## 2018-02-01 VITALS
RESPIRATION RATE: 18 BRPM | TEMPERATURE: 98 F | OXYGEN SATURATION: 100 % | SYSTOLIC BLOOD PRESSURE: 127 MMHG | WEIGHT: 147 LBS | HEART RATE: 83 BPM | DIASTOLIC BLOOD PRESSURE: 86 MMHG | HEIGHT: 67 IN | BODY MASS INDEX: 23.07 KG/M2

## 2018-02-01 PROBLEM — Z3A.39 39 WEEKS GESTATION OF PREGNANCY: Status: RESOLVED | Noted: 2018-01-29 | Resolved: 2018-02-01

## 2018-02-01 PROCEDURE — 25000003 PHARM REV CODE 250: Performed by: OBSTETRICS & GYNECOLOGY

## 2018-02-01 PROCEDURE — 99238 HOSP IP/OBS DSCHRG MGMT 30/<: CPT | Mod: ,,, | Performed by: OBSTETRICS & GYNECOLOGY

## 2018-02-01 RX ADMIN — OXYCODONE AND ACETAMINOPHEN 1 TABLET: 5; 325 TABLET ORAL at 01:02

## 2018-02-01 NOTE — NURSING
1015am=  Discharge instructions given and explained to pt.  Pt verbalized understanding. Pt reports [facility]  Refuses Flu vacc, but already received TDAP.  All belongings packed per pt.  All questions answered.  1025am=  Pt requested to ambulate off unit for discharge.  Pt discharged off unit with no distress noted.  Accompanied by this nurse.

## 2018-02-01 NOTE — DISCHARGE INSTRUCTIONS
"Patient Discharge Instructions for Postpartum Women    Resume Regular Diet  Increase activity gradually, no heavy lifting  Shower  No tampons, douching or sexual intercourse.  Discuss birth control options with your physician.  Wear a support bra  Return to work/school when you've been cleared by a physician    Call your physician if     *Fever of 100.4 or higher  *Persistent nausea/ vomiting  *Incisional drainage  *Heavy vaginal bleeding or large clots (Heavy bleeding is soaking 1 pad in an hour)  *Swelling and pain in arms or legs  *Severe headaches, blurred vision or fainting  *Shortness of breath  *Frequency and burning with urination  *Signs of postpartum depression, discuss these signs with your physician    Call lactation services for questions regarding feeding, nipple and breast care, and general questions about lactation.  They can be reached at [phone]         Understanding Postpartum Depression    You've just had a baby.  You know you should be excited and happy.  But instead you find yourself crying for no reason.  You may have trouble coping with your daily tasks.  You feel sad, tired, and hopeless most of the time.  You may even feel ashamed or guilty.  But what you're going through is not your fault and you can feel better.  Talk to your doctor.  He or she can help.    Depression After Childbirth    You may be weepy and tired right after giving birth.  These feelings are normal.  They're sometimes called the "baby blues."  These blues go away 2-3 weeks.  However, postpartum (meaning "after birth") depression lasts much longer and is more sever than the "baby blues."  It can make you feel sad and hopeless.  You may also fear that your baby will be harmed and worry about being a bad mother.      What is Depression?    Depression is a mood disorder that affects the way you think and feel.  The most common symptom is a feeling of deep sadness.  You may also feel as if you just can't cope with life.  "   Other symptoms include:      * Gaining or loosing weight  * Sleeping too much or too little  * Feeling tired all the time  * Feeling restless  * Fears of harming your baby   * Lack of interest in your baby  * Feeling worthless or guilty  * No longer finding pleasure in things you used to  * Having trouble thinking clearly or making decisions  * Thoughts of hurting yourself or your baby    What Causes Postpartum Depression    The exact causes of postpartum depression isn't known.  It may be due to changes in your hormones during and after childbirth.  You may also be tired from caring for your baby and adjusting to being a mother.  All these factors may make you feel depressed.  In some cases, your genes may also play a role.    Depression Can Be Treated    The good news is that there are many ways to treat postpartum depression.  Talking to your doctor is the first step toward feeling better.    Resources:    * National [place] of Mental Health  -- 922.642.9199    www.nimh.nih.gov    * National [place] on Mental Illness --123.986.1115    Www.caro.org    * Mental Health [place] -- 548.663.4307     Www.[facility].org    * National Suicide Hotline --244.334.3103 (800-SUICIDE)    7306-0051 [employer]  All rights reserved.  This information is not intended as a substitute for professional medical care.  Always follow up with your healthcare professional's instructions.    Breastfeeding Discharge Instructions       Feed the baby at the earliest sign of hunger or comfort  o Hands to mouth, sucking motions  o Rooting or searching for something to suck on  o Dont wait for crying - it is a sign of distress     The feedings may be 8-12 times per 24hrs and will not follow a schedule   Avoid pacifiers and bottles for the first 4 weeks   Alternate the breast you start the feeding with, or start with the breast that feels the fullest   Switch breasts when the baby takes himself off the breast or falls  asleep   Keep offering breasts until the baby looks full, no longer gives hunger signs, and stays asleep when placed on his back in the crib   If the baby is sleepy and wont wake for a feeding, put the baby skin-to-skin dressed in a diaper against the mothers bare chest   Sleep near your baby   The baby should be positioned and latched on to the breast correctly  o Chest-to-chest, chin in the breast  o Babys lips are flipped outward  o Babys mouth is stretched open wide like a shout  o Babys sucking should feel like tugging to the mother  - The baby should be drinking at the breast:  o You should hear swallowing or gulping throughout the feeding  o You should see milk on the babys lips when he comes off the breast  o Your breasts should be softer when the baby is finished feeding  o The baby should look relaxed at the end of feedings  o After the 4th day and your milk is in:  o The babys poop should turn bright yellow and be loose, watery, and seedy  o The baby should have at least 3-4 poops the size of the palm of your hand per day  o The baby should have at least 5-6 wet diapers per day  o The urine should be light yellow in color  You should drink when you are thirsty and eat a healthy diet when you are    hungry.     Take naps to get the rest you need.   Take medications and/or drink alcohol only with permission of your obstetrician    or the babys pediatrician.  You can also call the [facility],   ([phone]), Monday-Friday, 8am-5pm Central time, to get the most   up-to-date evidence-based information on the use of medications during   pregnancy and breastfeeding.      The baby should be examined by a pediatrician at 3-5 days of age.  Once your   milk comes in, the baby should be gaining at least ½ - 1oz each day and should be back to birthweight no later than 10-14 days of age.          Community Resources    [facility] Breastfeeding Warmline: [phone]  [facility]  clinics: provide incentives and breastpumps to eligible mothers  [employer] (LLLI):  mother-to-mother support group website        www.lll.org  [facility] [employer] mother-to-mother support groups:        www.lljerzyQian Xiao[employer]er[employer]        [employer] [facility]   Dr. Raj Castro website for latch videos and general information:        www.breastfeedinginc.ca  [facility] is a call center that provides information about the safety of taking medications while breastfeeding.  Call [phone], M-F, 8am-5pm, [place].  International Lactation Consultant Association provides resources for assistance:        www.ilca.org  [facility] Breastfeeding Coalition provides informationand resources for parents  and the community    http://[facility].org     Kristina Salamanca is a mom-to-mom support group:                             www.[employer]//breastfeedng-support/  [employer] for Healthy Babies:  5-031-375-BABY(1231)  [place] [employer] [place]: a breastfeeding support group for women of color, [phone]

## 2018-02-01 NOTE — DISCHARGE SUMMARY
Admitting Diagnosis: IUP at term, IOL, SS trait, anemia 2/2 iron deficiency, anxiety, AMA  Discharge Diagnosis: s/p , SS trait, anemia 2/2 iron deficiency, anxiety, AMA  Procedure:   Service: OB-GYN, [place]    Consults: none   Hospital Course: Patient was admitted to L&D for elective IOL. She had a uncomplicated vaginal delivery of a VMI.  She was transferred to [facility] baby in stable condition.  Her recovery was uncomplicated and by post-partum day 2 she was tolerating PO without N/V, ambulating without difficulty, her pain was well controlled with PO pain medications and she had passed flatus. She was bottlefeeding. She desired IUD for birth control. She was stable and ready for discharge.   Medications: OTC ibuprofen,  Follow up: 6 week post partum visit  Instructions: Keep incision clean and dry, continue ambulation, take medications as needed and take stool softener as needed, pelvic rest until instructed otherwise by physician  Call or return to ED for fever >100.4, foul smelling vaginal discharge, heavy vaginal bleeding, abdominal pain not responsive to medications or other concerns.   Disposition: home

## 2018-02-01 NOTE — PROGRESS NOTES
[facility]  Obstetrics  Postpartum Progress Note    Patient Name: Kary Magaña  MRN: [id number]  Admission Date: 2018  Hospital Length of Stay: 3 days  Attending Physician: Shruthi Zamarripa MD  Primary Care Provider: Primary Doctor No    Subjective:     Principal Problem:39 weeks gestation of pregnancy    Hospital course: s/p uncomplicated      She is doing well this morning. She is tolerating a regular diet without nausea or vomiting. She is voiding spontaneously. She is ambulating. She has passed flatus, and has not a BM. Vaginal bleeding is mild. She denies fever or chills. Abdominal pain is mild and controlled with oral medications. She is not breastfeeding.     Objective:yes     Vital Signs (Most Recent):  Temp: 98.2 °F (36.8 °C) (18 0400)  Pulse: 90 (18 0400)  Resp: 18 (18 0400)  BP: 124/74 (18 0400)  SpO2: 100 % (18 1445) Vital Signs (24h Range):  Temp:  [97.7 °F (36.5 °C)-98.2 °F (36.8 °C)] 98.2 °F (36.8 °C)  Pulse:  [90-93] 90  Resp:  [18] 18  BP: (117-124)/(74-86) 124/74     Weight: 66.7 kg (147 lb)  Body mass index is 23.02 kg/m².    No intake or output data in the 24 hours ending 18 0747    Physical Exam:   Constitutional: She appears well-developed and well-nourished.    HENT:   Head: Normocephalic.    Eyes: EOM are normal.     Cardiovascular: Normal rate.     Pulmonary/Chest: Breath sounds normal.        Abdominal: Soft. Bowel sounds are normal.                 Neurological: She is alert.     Fundus firm and below umbilicus     Significant Labs:  Lab Results   Component Value Date    GROUPTRH A POS 2018    HEPBSAG Negative 2017    STREPBCULT No Group B Streptococcus isolated 01/10/2018       Recent Labs  Lab 18  0500   HGB 9.7*   HCT 30.1*         Assessment/Plan:     37 y.o. female  at 39w2d for:    Active Diagnoses:    Diagnosis Date Noted POA    PRINCIPAL PROBLEM:  39 weeks gestation of pregnancy [Z3A.39]  2018 Not Applicable     (spontaneous vaginal delivery) [O80] 2018 Not Applicable    Sickle cell trait [D57.3] 2017 Yes    Grand multiparity [Z64.1] 2017 Not Applicable    Elderly multigravida in third trimester [O09.523] 2017 Yes    Anxiety- on lexapro [F41.9] 2017 Yes      Problems Resolved During this Admission:    Diagnosis Date Noted Date Resolved POA     1. Continue routine care      Disposition: As patient meets milestones, will plan to discharge PPD 2.    Umm He MD  Obstetrics  [facility]

## 2018-02-02 RX ORDER — ESCITALOPRAM OXALATE 10 MG/1
TABLET ORAL
Qty: 30 TABLET | Refills: 11 | OUTPATIENT
Start: 2018-02-02

## 2018-02-03 ENCOUNTER — NURSE TRIAGE (OUTPATIENT)
Dept: ADMINISTRATIVE | Facility: CLINIC | Age: 38
End: 2018-02-03

## 2018-02-05 ENCOUNTER — TELEPHONE (OUTPATIENT)
Dept: OBSTETRICS AND GYNECOLOGY | Facility: CLINIC | Age: 38
End: 2018-02-05

## 2018-02-05 RX ORDER — NAPROXEN SODIUM 550 MG/1
550 TABLET ORAL 2 TIMES DAILY PRN
Qty: 60 TABLET | Refills: 0 | Status: SHIPPED | OUTPATIENT
Start: 2018-02-05 | End: 2018-02-12

## 2018-02-05 RX ORDER — IBUPROFEN 600 MG/1
600 TABLET ORAL EVERY 6 HOURS PRN
Qty: 30 TABLET | Refills: 1 | Status: SHIPPED | OUTPATIENT
Start: 2018-02-05 | End: 2018-02-05

## 2018-02-05 NOTE — TELEPHONE ENCOUNTER
----- Message from Kathleen Bella sent at 2/5/2018  1:24 PM CST -----  Contact: [phone]/ self   Pt its requesting a prescription for pain with out ibuprofen . Please advise

## 2018-02-05 NOTE — TELEPHONE ENCOUNTER
----- Message from Tiffany Recinos sent at 2/5/2018 11:16 AM CST -----  Contact: [phone]/self  Patient would like a refill of ibuprofen (ADVIL,MOTRIN) 600 MG tablet.  Please advise.

## 2018-02-05 NOTE — TELEPHONE ENCOUNTER
Call placed to pt for discharge teaching. States she continuing to breastfeed. Denies nipple pain but mild tenderness to breasts. Encouraged frequent feedings and warm shower/compresses with massage and compression to relieve symptoms. S/s mastitis discussed. Instructed to call MD if any symptoms appear. States understanding and denies any further questions or needs.

## 2018-02-05 NOTE — TELEPHONE ENCOUNTER
Patient is requesting a different medication  She states medicaid does not cover Ibuprofen.  She is complaining of back pain and cramping from breast feeding.

## 2018-02-05 NOTE — TELEPHONE ENCOUNTER
Patient informed a prescription for naproxen was sent to the pharmacy  Pharmacist Bill stated it was too soon for the patient to fill the ibuprofen

## 2018-02-06 ENCOUNTER — TELEPHONE (OUTPATIENT)
Dept: OBSTETRICS AND GYNECOLOGY | Facility: CLINIC | Age: 38
End: 2018-02-06

## 2018-02-06 NOTE — TELEPHONE ENCOUNTER
----- Message from Gladys Kitchen sent at 2/5/2018  5:34 PM CST -----  Contact: self / [phone]  Patient is requesting a call back regarding her perscription for naproxen sodium (ANAPROX DS) 550 MG tablet. Please advise

## 2018-02-06 NOTE — TELEPHONE ENCOUNTER
Returned pt's call. Pt states she is taking 3 of the Naproxens at a time and it is helping. Pt was only calling to let the office know.

## 2018-03-09 ENCOUNTER — POSTPARTUM VISIT (OUTPATIENT)
Dept: OBSTETRICS AND GYNECOLOGY | Facility: CLINIC | Age: 38
End: 2018-03-09
Payer: MEDICAID

## 2018-03-09 VITALS
HEIGHT: 67 IN | SYSTOLIC BLOOD PRESSURE: 110 MMHG | BODY MASS INDEX: 24.26 KG/M2 | WEIGHT: 154.56 LBS | DIASTOLIC BLOOD PRESSURE: 70 MMHG

## 2018-03-09 DIAGNOSIS — Z30.430 ENCOUNTER FOR IUD INSERTION: ICD-10-CM

## 2018-03-09 PROCEDURE — 58300 INSERT INTRAUTERINE DEVICE: CPT | Mod: PBBFAC,PO | Performed by: OBSTETRICS & GYNECOLOGY

## 2018-03-09 PROCEDURE — 99213 OFFICE O/P EST LOW 20 MIN: CPT | Mod: PBBFAC,PO | Performed by: OBSTETRICS & GYNECOLOGY

## 2018-03-09 PROCEDURE — 58300 INSERT INTRAUTERINE DEVICE: CPT | Mod: S$PBB,,, | Performed by: OBSTETRICS & GYNECOLOGY

## 2018-03-09 PROCEDURE — 99999 PR PBB SHADOW E&M-EST. PATIENT-LVL III: CPT | Mod: PBBFAC,,, | Performed by: OBSTETRICS & GYNECOLOGY

## 2018-03-09 PROCEDURE — 0503F POSTPARTUM CARE VISIT: CPT | Mod: ,,, | Performed by: OBSTETRICS & GYNECOLOGY

## 2018-03-09 RX ORDER — FERROUS SULFATE 325(65) MG
TABLET ORAL
Refills: 3 | COMMUNITY
Start: 2018-01-31 | End: 2021-04-21

## 2018-03-09 RX ADMIN — LEVONORGESTREL 1 INTRA UTERINE DEVICE: 52 INTRAUTERINE DEVICE INTRAUTERINE at 09:03

## 2018-03-09 NOTE — PROGRESS NOTES
"CC: Post-partum follow-up    Kary Magaña is a 37 y.o. female  who presents for post-partum visit.  Pregnancy was uncomplicated. She is S/P a .  She and the baby are doing well.  No pain.  No fever.  No bowel / bladder complaints.    Delivery Date: 2018  Delivery MD: moon  Gender: male  Birth Weight: 6 pounds 2.9 ounces  Breast Feeding: NO  Depression: NO  Contraception: IUD    /70   Ht 5' 7" (1.702 m)   Wt 70.1 kg (154 lb 8.7 oz)   LMP 2017   BMI 24.20 kg/m²     ROS:  GENERAL: No fever, chills, fatigability.  VULVAR: No pain, no lesions and no itching.  VAGINAL: No relaxation, no itching, no discharge, no abnormal bleeding and no lesions.  ABDOMEN: No abdominal pain. Denies nausea. Denies vomiting. No diarrhea. No constipation  BREAST: Denies pain. No lumps. No discharge.  URINARY: No incontinence, no nocturia, no frequency and no dysuria.  CARDIOVASCULAR: No chest pain. No shortness of breath. No leg cramps.  NEUROLOGICAL: No headaches. No vision changes.    PHYSICAL EXAM:  General: alert  Heart:regular rate and rhythm, S1, S2 normal  Lungs: clear to auscultation bilaterally  Abdomen: Soft, non-tender, non-distended  Vulva:  Normal, no lesions  Cervix:  Without lesions, polyps or tenderness.  Uterus:  Normal size, shape, consistency, no mass or tenderness.  Adnexa:  Normal in size without mass or tenderness  Extremities: peripheral pulses normal, no pedal edema, no clubbing or cyanosis    DATE: 18    TIME: 930    PROCEDURE:  Mirena insertion    INDICATION: Contraception    PATIENT CONSENT: She was counseled on the risks, benefits, indications, and alternatives to IUD use.  She understands that with insertion there is a risk of bleeding, infection, and uterine perforation.  All questions are answered.  Consents signed.     LABS: UPT is negative. Cervical cultures were not performed.    ANESTHESIA: NONE    PROCEDURE NOTE:    Time out performed.  The cervix was " visualized with a speculum. The cervix was cleansed with betadine swab x 3.  A single tooth tenaculum was placed on the anterior lip of the cervix. The uterus sounds to 7cm using sterile technique. A Mirena was loaded and placed high in the uterine fundus without difficulty using sterile technique.The string was was then cut.The tenaculum and speculum were removed.    COMPLICATIONS: None    PATIENT DISPOSITION: The patient tolerated the procedure well.    Assessment:  1.  Contraceptive management/IUD insertion    Post IUD placement counseling:  Manage post IUD placement pain with NSAIDS, Tylenol or Rx per Medcard. IUD danger signs and how to check for strings were discussed. The IUD needs to be removed in 5 years for Mirena and 10 years for Copper IUD.    ASSESMENT:  Doing well S/P   Instructions / precautions reviewed  Contraceptive- rtc in 4 weeks for string check      PLAN:  May resume normal activities      Shruthi Zamarripa MD, FACOG  OB/GYN  Pager: [phone]

## 2018-03-13 ENCOUNTER — TELEPHONE (OUTPATIENT)
Dept: OBSTETRICS AND GYNECOLOGY | Facility: CLINIC | Age: 38
End: 2018-03-13

## 2018-03-13 NOTE — TELEPHONE ENCOUNTER
Contacted pt and informed her of Dr. Zamarripa's advice. Pt scheduled to come in 3/14 at 1:30pm. Patient verbalized understanding.

## 2018-03-13 NOTE — TELEPHONE ENCOUNTER
----- Message from Tiffany Recinos sent at 3/13/2018  2:47 PM CDT -----  Contact: [phone]/self  Patient requesting to speak with you regarding experiencing cramping and abdomen pain from her IUD. Patient would like to know if the pain is normal. Please advise.

## 2018-03-13 NOTE — TELEPHONE ENCOUNTER
Returned pt's call. Pt states she is experienced cramping and bleeding since it was placed. Pt states she is taking ibuprofen 800mg and its not helping. Pt states the pain is at a 5 or 6 with taking the ibuprofen.

## 2018-03-13 NOTE — TELEPHONE ENCOUNTER
Some cramping is def possible but it should be alleviated with ibuprofen-jaya the 800mg dosage. We can see her back in clinic for f/u     Shruthi Zamarripa MD, FACOG  OB/GYN  Pager: [phone]

## 2018-03-15 ENCOUNTER — OFFICE VISIT (OUTPATIENT)
Dept: OBSTETRICS AND GYNECOLOGY | Facility: CLINIC | Age: 38
End: 2018-03-15
Payer: MEDICAID

## 2018-03-15 ENCOUNTER — TELEPHONE (OUTPATIENT)
Dept: OBSTETRICS AND GYNECOLOGY | Facility: CLINIC | Age: 38
End: 2018-03-15

## 2018-03-15 VITALS
DIASTOLIC BLOOD PRESSURE: 90 MMHG | WEIGHT: 162.13 LBS | SYSTOLIC BLOOD PRESSURE: 120 MMHG | BODY MASS INDEX: 25.45 KG/M2 | HEIGHT: 67 IN

## 2018-03-15 DIAGNOSIS — R10.2 PELVIC PAIN IN FEMALE: ICD-10-CM

## 2018-03-15 DIAGNOSIS — Z30.431 IUD CHECK UP: Primary | ICD-10-CM

## 2018-03-15 PROCEDURE — 99999 PR PBB SHADOW E&M-EST. PATIENT-LVL III: CPT | Mod: PBBFAC,,, | Performed by: OBSTETRICS & GYNECOLOGY

## 2018-03-15 PROCEDURE — 99213 OFFICE O/P EST LOW 20 MIN: CPT | Mod: PBBFAC,PO | Performed by: OBSTETRICS & GYNECOLOGY

## 2018-03-15 PROCEDURE — 99212 OFFICE O/P EST SF 10 MIN: CPT | Mod: S$PBB,,, | Performed by: OBSTETRICS & GYNECOLOGY

## 2018-03-15 RX ORDER — TRAMADOL HYDROCHLORIDE 50 MG/1
50 TABLET ORAL EVERY 6 HOURS PRN
Qty: 20 TABLET | Refills: 0 | Status: SHIPPED | OUTPATIENT
Start: 2018-03-15 | End: 2018-03-18

## 2018-03-15 NOTE — PROGRESS NOTES
GYNECOLOGY OFFICE NOTE    Reason for visit: iud check    HPI: Pt is a 37 y.o.  female  who presents for iud check. Had it inserted 3/9/18. Variable bleeding but a lot of abdominal cramping. Cramping not really alleviated with ibuprofen otc. Occasional back pain    Past Medical History:   Diagnosis Date    Sickle cell trait        History reviewed. No pertinent surgical history.    History reviewed. No pertinent family history.    Social History   Substance Use Topics    Smoking status: Never Smoker    Smokeless tobacco: Never Used    Alcohol use No      Comment: socially prior to pregnancy       OB History    Para Term  AB Living   10 9 8 1 1 9   SAB TAB Ectopic Multiple Live Births   1     0 9      # Outcome Date GA Lbr Juan/2nd Weight Sex Delivery Anes PTL Lv   10 Term 18 39w2d / 00:12 2.805 kg (6 lb 2.9 oz) M Vag-Spont Spinal, EPI N RL   9 Term 01/13/15 39w0d  3.487 kg (7 lb 11 oz) F Vag-Spont   RL   8 Term 11 37w0d  3.487 kg (7 lb 11 oz) M Vag-Spont   RL   7 Term 10/03/08 38w0d  3.487 kg (7 lb 11 oz) M Vag-Spont   RL   6 Term 06 37w0d  3.487 kg (7 lb 11 oz) M Vag-Spont   RL   5  04 36w0d  3.033 kg (6 lb 11 oz) M Vag-Spont   RL   4 Term 03 38w0d  3.487 kg (7 lb 11 oz) M Vag-Spont   RL   3 Term 07/15/99 37w5d  3.487 kg (7 lb 11 oz) M Vag-Spont   RL   2 Term 97 37w0d  3.827 kg (8 lb 7 oz) M Vag-Spont   RL   1 SAB  4w0d                 Current Outpatient Prescriptions   Medication Sig    escitalopram oxalate (LEXAPRO) 20 MG tablet Take 40 mg by mouth once daily.    ferrous sulfate 325 (65 FE) MG EC tablet Take 1 tablet (325 mg total) by mouth 2 (two) times daily.    ferrous sulfate 325 mg (65 mg iron) Tab tablet TAKE 1 TABLET (325 MG TOTAL) BY MOUTH 2 (TWO) TIMES DAILY.    traMADol (ULTRAM) 50 mg tablet Take 1 tablet (50 mg total) by mouth every 6 (six) hours as needed for Pain.     No current facility-administered medications  "for this visit.        Allergies: Patient has no known allergies.     BP (!) 120/90   Ht 5' 7" (1.702 m)   Wt 73.5 kg (162 lb 2.4 oz)   LMP 07/05/2017   Breastfeeding? No   BMI 25.40 kg/m²     ROS:  GENERAL: Denies fever or chills.   SKIN: Denies rash or lesions.   HEAD: Denies head injury or headache.   CHEST: Denies chest pain or shortness of breath.   CARDIOVASCULAR: Denies palpitations or chest pain.   ABDOMEN: No abdominal pain, constipation, diarrhea, nausea, vomiting or rectal bleeding.   URINARY: No dysuria, hematuria, or burning on urination.  REPRODUCTIVE: See HPI.   BREASTS: Denies pain, lumps, or nipple discharge.   NEUROLOGIC: Denies syncope or weakness.     Physical Exam:  GENERAL: alert, appears stated age and cooperative  CHEST: Normal respiratory effort  NECK: normal appearance, no thyromegaly masses or tenderness  SKIN: no acne, striae, hirsutism  BREAST EXAM: not examined  ABDOMEN: abdomen is soft without significant tenderness, masses, organomegaly or guarding, no hernias noted  EXTERNAL GENITALIA:  normal general appearance  URETHRA: normal urethra, normal urethral meatus  VAGINA:  normal mucosa without prolapse or lesions  CERVIX:  iud strings visible,  UTERUS:  normal size, mobile, non-tender  ADNEXA:  normal adnexa in size, nontender and no masses    Diagnosis:  1. IUD check up    2. Pelvic pain in female        Plan:   1. Strings visible but order for U/S placed to confirm proper placement. Not alleviated with ibuprofen- rx 3 days of tramadol q 6hr prn. If not better proceed with U/S. Given precautions    Orders Placed This Encounter    traMADol (ULTRAM) 50 mg tablet    US OB/GYN Procedure ([facility])       Patient was counseled today on the new ACS guidelines for cervical cytology screening as well as the current recommendations for breast cancer screening. She was counseled to follow up with her PCP for other routine health maintenance.     Follow-up if symptoms worsen or fail to " improve.      Shruthi Zamarripa MD  OB/GYN  Pager: [phone]

## 2018-03-15 NOTE — TELEPHONE ENCOUNTER
Attempted to contact pt regarding appt today at 11:30am. No answer. Left message to return call.

## 2018-06-26 ENCOUNTER — TELEPHONE (OUTPATIENT)
Dept: OBSTETRICS AND GYNECOLOGY | Facility: CLINIC | Age: 38
End: 2018-06-26

## 2018-06-26 NOTE — TELEPHONE ENCOUNTER
I spoke with the patient and she wants to switch doctors to Dr. Sanchez.  She had no further questions.

## 2018-07-04 RX ORDER — ESCITALOPRAM OXALATE 20 MG/1
TABLET ORAL
Qty: 30 TABLET | Refills: 6 | OUTPATIENT
Start: 2018-07-04

## 2019-08-28 DIAGNOSIS — M54.2 NECK PAIN: Primary | ICD-10-CM

## 2019-09-06 ENCOUNTER — HOSPITAL ENCOUNTER (EMERGENCY)
Facility: HOSPITAL | Age: 39
Discharge: HOME | End: 2019-09-06
Attending: EMERGENCY MEDICINE
Payer: MEDICAID

## 2019-09-06 VITALS
HEIGHT: 67 IN | DIASTOLIC BLOOD PRESSURE: 101 MMHG | HEART RATE: 110 BPM | RESPIRATION RATE: 20 BRPM | TEMPERATURE: 98 F | SYSTOLIC BLOOD PRESSURE: 177 MMHG | BODY MASS INDEX: 21.82 KG/M2 | WEIGHT: 139 LBS | OXYGEN SATURATION: 97 %

## 2019-09-06 DIAGNOSIS — L85.3 DRY SKIN: Primary | ICD-10-CM

## 2019-09-06 PROCEDURE — 99283 PR EMERGENCY DEPT VISIT,LEVEL III: ICD-10-PCS | Mod: ,,, | Performed by: PHYSICIAN ASSISTANT

## 2019-09-06 PROCEDURE — 99283 EMERGENCY DEPT VISIT LOW MDM: CPT | Mod: ,,, | Performed by: PHYSICIAN ASSISTANT

## 2019-09-06 PROCEDURE — 99283 EMERGENCY DEPT VISIT LOW MDM: CPT

## 2019-09-06 RX ORDER — HYDROXYZINE HYDROCHLORIDE 25 MG/1
25 TABLET, FILM COATED ORAL EVERY 6 HOURS PRN
Qty: 12 TABLET | Refills: 0 | Status: SHIPPED | OUTPATIENT
Start: 2019-09-06 | End: 2021-06-03

## 2019-09-06 RX ORDER — DIPHENHYDRAMINE HCL 50 MG
50 CAPSULE ORAL EVERY 6 HOURS PRN
COMMUNITY
End: 2021-06-03

## 2019-09-06 RX ORDER — PREDNISONE 20 MG/1
20 TABLET ORAL DAILY
COMMUNITY
End: 2021-04-20

## 2019-09-06 NOTE — ED TRIAGE NOTES
""see all this, here [points to palm of hand at base of thumb], and here [points to top of hands], I thought it was hives or something, but this swelling and rashes keep coming and going and moving all round in front my eyes! I've never dealt with something like this before and it's got me freaked out. It's all this swelling and pulling and pain and rashes and discoloration and I just don't know what to do". Pt reports taking benadryl and prednisone with minimal relief.  "

## 2019-09-06 NOTE — ED PROVIDER NOTES
"Encounter Date: 9/6/2019       History     Chief Complaint   Patient presents with    Hand swelling     Pt reports intermittent swelling to hands x few weeks with itching. Reports has been treating with OTC meds. Pt extremly anxious about current symptoms but no obvious swelling or hives noted on hands.      38-year-old female presents to the ED for evaluation of hand swelling and itching.  Patient reports severe itching to bilateral hands as well as intermittent knots" over the knuckles.  Pt reports similar symptoms, rash to the feet. Symptoms began 3 weeks ago. She denies numbness or tingling, throat or tongue swelling, SOB.  She recently started taking an OTC supplement prior to onset of symtpoms which she has since discontinued.  Pt has been self-treating with her child's prednisone        Review of patient's allergies indicates:  No Known Allergies  Past Medical History:   Diagnosis Date    Sickle cell trait      History reviewed. No pertinent surgical history.  History reviewed. No pertinent family history.  Social History     Tobacco Use    Smoking status: Never Smoker    Smokeless tobacco: Never Used   Substance Use Topics    Alcohol use: Yes     Comment: occassionally    Drug use: No     Review of Systems   Constitutional: Negative for fever.   HENT: Negative for facial swelling, sore throat and trouble swallowing.    Respiratory: Negative for shortness of breath.    Cardiovascular: Negative for chest pain.   Gastrointestinal: Negative for nausea.   Genitourinary: Negative for dysuria.   Musculoskeletal: Negative for back pain.   Skin: Positive for color change and rash.        +ithcing, +"knots"   Neurological: Negative for weakness.   Hematological: Does not bruise/bleed easily.       Physical Exam     Initial Vitals [09/06/19 0726]   BP Pulse Resp Temp SpO2   (!) 177/101 110 20 98 °F (36.7 °C) 97 %      MAP       --         Physical Exam    Nursing note and vitals reviewed.  Constitutional: She " appears well-developed and well-nourished. She is not diaphoretic.  Non-toxic appearance. She does not appear ill. No distress.   HENT:   Head: Normocephalic and atraumatic.   Neck: Neck supple.   Cardiovascular: Normal rate and regular rhythm. Exam reveals no gallop and no friction rub.    No murmur heard.  Pulmonary/Chest: Effort normal and breath sounds normal. No accessory muscle usage. No tachypnea. No respiratory distress. She has no decreased breath sounds. She has no wheezes. She has no rhonchi. She has no rales.   Abdominal: She exhibits no distension.   Neurological: She is alert.   Skin: Skin is dry and intact. No rash and no abscess noted. No erythema.   Small scattered dry patches to the dorsal knuckles. No swelling noted to the hands. Brisk capillary refill    Psychiatric: Her mood appears anxious. Her speech is rapid and/or pressured.         ED Course   Procedures  Labs Reviewed - No data to display       Imaging Results    None          Medical Decision Making:   History:   Old Medical Records: I decided to obtain old medical records.  Differential Diagnosis:   My differential diagnosis includes but is not limited to:  Allergic reaction, dermatitis, cellulitis, dry skin, scabies  ED Management:  37 yo F presents with itching, swelling, and suspected rash/hives to the hands for the past 3 weeks despite OTC meds and steroids. Pt appears quite anxious on exam in regard to her symptoms. She is hypertensive and mildly tachycardic on exam, but appears otherwise well.  I see no evidence of hives, infectious process, skin lesions, or significant injury on exam.  Unclear etiology of pt's complaints. Appears to be dry skin.  I have advised against taking her child's prednisone.  There is no indication for acute imaging or other emergent studies at this time.  I will place referral Dermatology.  I have advised patient to moisturize the skin with lotion.  Will provide prescription for Atarax for itching.  ED  return precautions given.  Stable for discharge.  I have reviewed the patient's records and discussed this case with my supervising physician.                        Clinical Impression:       ICD-10-CM ICD-9-CM   1. Dry skin L85.3 701.1         Disposition:   Disposition: Discharged  Condition: Stable                        Kate Roy PA-C  09/06/19 154

## 2019-09-06 NOTE — DISCHARGE INSTRUCTIONS
You can purchase Cera Ve lotion to keep your hands and feet moisturized.  You can also soak your feet in warm salt water and use a pumice stone to exfoliate the feet and remove dry skin.    Follow up At [facility] or at [facility] for further evaluation if your symptoms do not improve.  Return to the ER promptly for any NEW or WORSENING symptoms such as shortness of breath, tongue swelling, sensation that your throat is closing or any other concerning symptoms.

## 2019-11-01 RX ORDER — ESCITALOPRAM OXALATE 20 MG/1
TABLET ORAL
Qty: 30 TABLET | Refills: 0 | OUTPATIENT
Start: 2019-11-01

## 2019-11-07 RX ORDER — ESCITALOPRAM OXALATE 20 MG/1
TABLET ORAL
Qty: 30 TABLET | Refills: 0 | OUTPATIENT
Start: 2019-11-07

## 2020-07-21 NOTE — TELEPHONE ENCOUNTER
Patient notified pharmacy confirmed receipt and her medication was sent. Patient verbalized understanding.   Lactated Ringers

## 2021-03-30 PROCEDURE — 99284 EMERGENCY DEPT VISIT MOD MDM: CPT | Mod: 25

## 2021-03-31 ENCOUNTER — HOSPITAL ENCOUNTER (EMERGENCY)
Facility: HOSPITAL | Age: 41
Discharge: HOME | End: 2021-03-31
Attending: EMERGENCY MEDICINE
Payer: MEDICAID

## 2021-03-31 VITALS
TEMPERATURE: 99 F | WEIGHT: 149 LBS | RESPIRATION RATE: 16 BRPM | DIASTOLIC BLOOD PRESSURE: 81 MMHG | SYSTOLIC BLOOD PRESSURE: 160 MMHG | HEIGHT: 67 IN | BODY MASS INDEX: 23.39 KG/M2 | OXYGEN SATURATION: 98 % | HEART RATE: 85 BPM

## 2021-03-31 DIAGNOSIS — M25.473 ANKLE SWELLING: ICD-10-CM

## 2021-03-31 DIAGNOSIS — M79.89 LEFT LEG SWELLING: ICD-10-CM

## 2021-03-31 LAB
B-HCG UR QL: NEGATIVE
CTP QC/QA: YES

## 2021-03-31 PROCEDURE — 81025 URINE PREGNANCY TEST: CPT | Performed by: EMERGENCY MEDICINE

## 2021-04-06 ENCOUNTER — OFFICE VISIT (OUTPATIENT)
Dept: FAMILY MEDICINE | Facility: HOSPITAL | Age: 41
End: 2021-04-06
Payer: MEDICAID

## 2021-04-06 VITALS
DIASTOLIC BLOOD PRESSURE: 90 MMHG | HEART RATE: 87 BPM | SYSTOLIC BLOOD PRESSURE: 129 MMHG | HEIGHT: 67 IN | WEIGHT: 149.69 LBS | BODY MASS INDEX: 23.49 KG/M2

## 2021-04-06 DIAGNOSIS — D89.89 AUTOIMMUNE DISORDER: Primary | ICD-10-CM

## 2021-04-06 DIAGNOSIS — F33.1 MODERATE EPISODE OF RECURRENT MAJOR DEPRESSIVE DISORDER: ICD-10-CM

## 2021-04-06 DIAGNOSIS — F41.9 ANXIETY: ICD-10-CM

## 2021-04-06 PROCEDURE — 99213 OFFICE O/P EST LOW 20 MIN: CPT | Performed by: STUDENT IN AN ORGANIZED HEALTH CARE EDUCATION/TRAINING PROGRAM

## 2021-04-06 RX ORDER — DUPILUMAB 300 MG/2ML
INJECTION, SOLUTION SUBCUTANEOUS
COMMUNITY
Start: 2021-03-12

## 2021-04-06 RX ORDER — GABAPENTIN 300 MG/1
300 CAPSULE ORAL 3 TIMES DAILY
COMMUNITY
Start: 2021-03-27 | End: 2021-04-12

## 2021-04-06 RX ORDER — ESCITALOPRAM OXALATE 20 MG/1
20 TABLET ORAL NIGHTLY
Qty: 90 TABLET | Refills: 3 | Status: SHIPPED | OUTPATIENT
Start: 2021-04-06 | End: 2021-04-20

## 2021-04-06 RX ORDER — HYDROXYZINE PAMOATE 25 MG/1
25 CAPSULE ORAL NIGHTLY PRN
COMMUNITY
Start: 2021-03-07 | End: 2021-06-03

## 2021-04-09 ENCOUNTER — LAB VISIT (OUTPATIENT)
Dept: LAB | Facility: HOSPITAL | Age: 41
End: 2021-04-09
Attending: STUDENT IN AN ORGANIZED HEALTH CARE EDUCATION/TRAINING PROGRAM
Payer: MEDICAID

## 2021-04-09 DIAGNOSIS — D89.89 AUTOIMMUNE DISORDER: ICD-10-CM

## 2021-04-09 LAB
ALBUMIN SERPL BCP-MCNC: 3.6 G/DL (ref 3.5–5.2)
ALP SERPL-CCNC: 64 U/L (ref 55–135)
ALT SERPL W/O P-5'-P-CCNC: 13 U/L (ref 10–44)
ANION GAP SERPL CALC-SCNC: 6 MMOL/L (ref 8–16)
ANTI SM/RNP ANTIBODY: 0.14 RATIO (ref 0–0.99)
ANTI-SM/RNP INTERPRETATION: NEGATIVE
AST SERPL-CCNC: 15 U/L (ref 10–40)
BASOPHILS # BLD AUTO: 0.07 K/UL (ref 0–0.2)
BASOPHILS NFR BLD: 1 % (ref 0–1.9)
BILIRUB SERPL-MCNC: 0.1 MG/DL (ref 0.1–1)
BUN SERPL-MCNC: 10 MG/DL (ref 6–20)
C3 SERPL-MCNC: 108 MG/DL (ref 50–180)
C4 SERPL-MCNC: 21 MG/DL (ref 11–44)
CALCIUM SERPL-MCNC: 8.2 MG/DL (ref 8.7–10.5)
CHLORIDE SERPL-SCNC: 107 MMOL/L (ref 95–110)
CO2 SERPL-SCNC: 25 MMOL/L (ref 23–29)
CREAT SERPL-MCNC: 0.9 MG/DL (ref 0.5–1.4)
CRP SERPL-MCNC: 0.8 MG/L (ref 0–8.2)
DIFFERENTIAL METHOD: ABNORMAL
EOSINOPHIL # BLD AUTO: 0.3 K/UL (ref 0–0.5)
EOSINOPHIL NFR BLD: 3.6 % (ref 0–8)
ERYTHROCYTE [DISTWIDTH] IN BLOOD BY AUTOMATED COUNT: 14.6 % (ref 11.5–14.5)
ERYTHROCYTE [SEDIMENTATION RATE] IN BLOOD BY WESTERGREN METHOD: 22 MM/HR (ref 0–20)
EST. GFR  (AFRICAN AMERICAN): >60 ML/MIN/1.73 M^2
EST. GFR  (NON AFRICAN AMERICAN): >60 ML/MIN/1.73 M^2
GLUCOSE SERPL-MCNC: 81 MG/DL (ref 70–110)
HCT VFR BLD AUTO: 32.3 % (ref 37–48.5)
HGB BLD-MCNC: 10.7 G/DL (ref 12–16)
IMM GRANULOCYTES # BLD AUTO: 0.01 K/UL (ref 0–0.04)
IMM GRANULOCYTES NFR BLD AUTO: 0.1 % (ref 0–0.5)
LYMPHOCYTES # BLD AUTO: 2.8 K/UL (ref 1–4.8)
LYMPHOCYTES NFR BLD: 40.2 % (ref 18–48)
MCH RBC QN AUTO: 28.2 PG (ref 27–31)
MCHC RBC AUTO-ENTMCNC: 33.1 G/DL (ref 32–36)
MCV RBC AUTO: 85 FL (ref 82–98)
MONOCYTES # BLD AUTO: 0.6 K/UL (ref 0.3–1)
MONOCYTES NFR BLD: 8.2 % (ref 4–15)
NEUTROPHILS # BLD AUTO: 3.3 K/UL (ref 1.8–7.7)
NEUTROPHILS NFR BLD: 46.9 % (ref 38–73)
NRBC BLD-RTO: 0 /100 WBC
PLATELET # BLD AUTO: 394 K/UL (ref 150–450)
PMV BLD AUTO: 10.3 FL (ref 9.2–12.9)
POTASSIUM SERPL-SCNC: 3.8 MMOL/L (ref 3.5–5.1)
PROT SERPL-MCNC: 6.6 G/DL (ref 6–8.4)
RBC # BLD AUTO: 3.79 M/UL (ref 4–5.4)
RHEUMATOID FACT SERPL-ACNC: <10 IU/ML (ref 0–15)
SODIUM SERPL-SCNC: 138 MMOL/L (ref 136–145)
WBC # BLD AUTO: 6.99 K/UL (ref 3.9–12.7)

## 2021-04-09 PROCEDURE — 86235 NUCLEAR ANTIGEN ANTIBODY: CPT | Mod: 59 | Performed by: STUDENT IN AN ORGANIZED HEALTH CARE EDUCATION/TRAINING PROGRAM

## 2021-04-09 PROCEDURE — 86147 CARDIOLIPIN ANTIBODY EA IG: CPT | Mod: 59 | Performed by: STUDENT IN AN ORGANIZED HEALTH CARE EDUCATION/TRAINING PROGRAM

## 2021-04-09 PROCEDURE — 86160 COMPLEMENT ANTIGEN: CPT | Mod: 59 | Performed by: STUDENT IN AN ORGANIZED HEALTH CARE EDUCATION/TRAINING PROGRAM

## 2021-04-09 PROCEDURE — 86256 FLUORESCENT ANTIBODY TITER: CPT | Performed by: STUDENT IN AN ORGANIZED HEALTH CARE EDUCATION/TRAINING PROGRAM

## 2021-04-09 PROCEDURE — 80053 COMPREHEN METABOLIC PANEL: CPT | Performed by: STUDENT IN AN ORGANIZED HEALTH CARE EDUCATION/TRAINING PROGRAM

## 2021-04-09 PROCEDURE — 86148 ANTI-PHOSPHOLIPID ANTIBODY: CPT | Performed by: STUDENT IN AN ORGANIZED HEALTH CARE EDUCATION/TRAINING PROGRAM

## 2021-04-09 PROCEDURE — 83516 IMMUNOASSAY NONANTIBODY: CPT | Performed by: STUDENT IN AN ORGANIZED HEALTH CARE EDUCATION/TRAINING PROGRAM

## 2021-04-09 PROCEDURE — 86225 DNA ANTIBODY NATIVE: CPT | Performed by: STUDENT IN AN ORGANIZED HEALTH CARE EDUCATION/TRAINING PROGRAM

## 2021-04-09 PROCEDURE — 85652 RBC SED RATE AUTOMATED: CPT | Performed by: STUDENT IN AN ORGANIZED HEALTH CARE EDUCATION/TRAINING PROGRAM

## 2021-04-09 PROCEDURE — 86431 RHEUMATOID FACTOR QUANT: CPT | Performed by: STUDENT IN AN ORGANIZED HEALTH CARE EDUCATION/TRAINING PROGRAM

## 2021-04-09 PROCEDURE — 86160 COMPLEMENT ANTIGEN: CPT | Performed by: STUDENT IN AN ORGANIZED HEALTH CARE EDUCATION/TRAINING PROGRAM

## 2021-04-09 PROCEDURE — 85025 COMPLETE CBC W/AUTO DIFF WBC: CPT | Performed by: STUDENT IN AN ORGANIZED HEALTH CARE EDUCATION/TRAINING PROGRAM

## 2021-04-09 PROCEDURE — 83520 IMMUNOASSAY QUANT NOS NONAB: CPT | Performed by: STUDENT IN AN ORGANIZED HEALTH CARE EDUCATION/TRAINING PROGRAM

## 2021-04-09 PROCEDURE — 83520 IMMUNOASSAY QUANT NOS NONAB: CPT | Mod: 59 | Performed by: STUDENT IN AN ORGANIZED HEALTH CARE EDUCATION/TRAINING PROGRAM

## 2021-04-09 PROCEDURE — 36415 COLL VENOUS BLD VENIPUNCTURE: CPT | Performed by: STUDENT IN AN ORGANIZED HEALTH CARE EDUCATION/TRAINING PROGRAM

## 2021-04-09 PROCEDURE — 86038 ANTINUCLEAR ANTIBODIES: CPT | Performed by: STUDENT IN AN ORGANIZED HEALTH CARE EDUCATION/TRAINING PROGRAM

## 2021-04-09 PROCEDURE — 83516 IMMUNOASSAY NONANTIBODY: CPT | Mod: 59 | Performed by: STUDENT IN AN ORGANIZED HEALTH CARE EDUCATION/TRAINING PROGRAM

## 2021-04-09 PROCEDURE — 86140 C-REACTIVE PROTEIN: CPT | Performed by: STUDENT IN AN ORGANIZED HEALTH CARE EDUCATION/TRAINING PROGRAM

## 2021-04-09 PROCEDURE — 86235 NUCLEAR ANTIGEN ANTIBODY: CPT | Performed by: STUDENT IN AN ORGANIZED HEALTH CARE EDUCATION/TRAINING PROGRAM

## 2021-04-12 ENCOUNTER — TELEPHONE (OUTPATIENT)
Dept: FAMILY MEDICINE | Facility: HOSPITAL | Age: 41
End: 2021-04-12

## 2021-04-12 LAB
ANA SER QL IF: NORMAL
ANTI-CENTROMERE ANTIBODY: NEGATIVE
CARDIOLIPIN IGG SER IA-ACNC: <9.4 GPL (ref 0–14.99)
CARDIOLIPIN IGM SER IA-ACNC: 10.9 MPL (ref 0–12.49)
CENTROMERE AB TITR SER IF: NORMAL TITER
DSDNA AB SER-ACNC: NORMAL [IU]/ML
ENA JO1 AB SER IA-ACNC: NORMAL AI
ENA SCL70 AB SER-ACNC: 3 UNITS
ENA SCL70 IGG SER IA-ACNC: <0.2 U

## 2021-04-12 RX ORDER — GABAPENTIN 300 MG/1
300 CAPSULE ORAL 3 TIMES DAILY
Qty: 90 CAPSULE | Refills: 1 | Status: CANCELLED | OUTPATIENT
Start: 2021-04-12

## 2021-04-12 RX ORDER — GABAPENTIN 400 MG/1
400 CAPSULE ORAL 3 TIMES DAILY
Qty: 90 CAPSULE | Refills: 0 | Status: SHIPPED | OUTPATIENT
Start: 2021-04-12 | End: 2021-05-17 | Stop reason: SDUPTHER

## 2021-04-14 ENCOUNTER — TELEPHONE (OUTPATIENT)
Dept: FAMILY MEDICINE | Facility: HOSPITAL | Age: 41
End: 2021-04-14

## 2021-04-15 ENCOUNTER — PATIENT MESSAGE (OUTPATIENT)
Dept: HEPATOLOGY | Facility: HOSPITAL | Age: 41
End: 2021-04-15

## 2021-04-15 LAB
PS IGA SER-ACNC: <20 U/ML
PS IGG SER-ACNC: <10 U/ML
PS IGM SER-ACNC: <25 U/ML
RNAP III AB SER-ACNC: <20 UNITS

## 2021-04-20 ENCOUNTER — OFFICE VISIT (OUTPATIENT)
Dept: FAMILY MEDICINE | Facility: HOSPITAL | Age: 41
End: 2021-04-20
Payer: MEDICAID

## 2021-04-20 DIAGNOSIS — N92.4 ABNORMAL VAGINAL BLEEDING IN PREMENOPAUSAL PATIENT: ICD-10-CM

## 2021-04-20 DIAGNOSIS — F32.2 CURRENT SEVERE EPISODE OF MAJOR DEPRESSIVE DISORDER WITHOUT PSYCHOTIC FEATURES, UNSPECIFIED WHETHER RECURRENT: Primary | ICD-10-CM

## 2021-04-20 DIAGNOSIS — M35.9 AUTOIMMUNE DISEASE: ICD-10-CM

## 2021-04-20 RX ORDER — FLUOXETINE 10 MG/1
10 CAPSULE ORAL DAILY
Qty: 30 CAPSULE | Refills: 11 | Status: SHIPPED | OUTPATIENT
Start: 2021-04-20 | End: 2021-06-03

## 2021-04-20 RX ORDER — TRIAMCINOLONE ACETONIDE 0.25 MG/G
CREAM TOPICAL
COMMUNITY
Start: 2021-04-18 | End: 2021-06-03

## 2021-04-20 RX ORDER — CLOTRIMAZOLE AND BETAMETHASONE DIPROPIONATE 10; .64 MG/G; MG/G
CREAM TOPICAL
COMMUNITY
Start: 2021-04-18 | End: 2021-06-03

## 2021-04-21 ENCOUNTER — TELEPHONE (OUTPATIENT)
Dept: FAMILY MEDICINE | Facility: HOSPITAL | Age: 41
End: 2021-04-21

## 2021-04-21 DIAGNOSIS — M35.9 AUTOIMMUNE DISEASE: Primary | ICD-10-CM

## 2021-04-22 RX ORDER — NAPROXEN 500 MG/1
500 TABLET ORAL 2 TIMES DAILY WITH MEALS
Qty: 28 TABLET | Refills: 0 | Status: SHIPPED | OUTPATIENT
Start: 2021-04-22 | End: 2021-05-06 | Stop reason: SDUPTHER

## 2021-04-25 LAB
ANTI-PM/SCL AB: <20 UNITS
ANTI-SS-A 52 KD AB, IGG: <20 UNITS
EJ AB SER QL: NEGATIVE
ENA JO1 AB SER IA-ACNC: <20 UNITS
ENA SM+RNP AB SER IA-ACNC: 24 UNITS
FIBRILLARIN (U3 RNP): NEGATIVE
KU AB SER QL: NEGATIVE
MDA-5 (P140): <20 UNITS
MI2 AB SER QL: NEGATIVE
NXP-2 (P140): <20 UNITS
OJ AB SER QL: NEGATIVE
PL12 AB SER QL: NEGATIVE
PL7 AB SER QL: NEGATIVE
SRP AB SERPL QL: NEGATIVE
TH/TO: NEGATIVE
TIF1 GAMMA (P155/140): <20 UNITS
U2 SNRNP: NEGATIVE

## 2021-05-06 LAB — MI2 AB SER QL: NORMAL

## 2021-05-10 RX ORDER — NAPROXEN 500 MG/1
500 TABLET ORAL 2 TIMES DAILY WITH MEALS
Qty: 28 TABLET | Refills: 0 | Status: SHIPPED | OUTPATIENT
Start: 2021-05-10 | End: 2021-05-24

## 2021-05-13 ENCOUNTER — HOSPITAL ENCOUNTER (EMERGENCY)
Facility: HOSPITAL | Age: 41
Discharge: LEFT BEFORE DISCHARGE | End: 2021-05-13
Attending: EMERGENCY MEDICINE
Payer: MEDICAID

## 2021-05-13 VITALS
DIASTOLIC BLOOD PRESSURE: 70 MMHG | HEART RATE: 85 BPM | SYSTOLIC BLOOD PRESSURE: 138 MMHG | OXYGEN SATURATION: 99 % | BODY MASS INDEX: 22.71 KG/M2 | WEIGHT: 145 LBS | RESPIRATION RATE: 18 BRPM | TEMPERATURE: 98 F

## 2021-05-13 DIAGNOSIS — R42 EPISODIC LIGHTHEADEDNESS: Primary | ICD-10-CM

## 2021-05-13 PROCEDURE — 99283 EMERGENCY DEPT VISIT LOW MDM: CPT

## 2021-05-13 PROCEDURE — 99282 PR EMERGENCY DEPT VISIT,LEVEL II: ICD-10-PCS | Mod: ,,, | Performed by: EMERGENCY MEDICINE

## 2021-05-13 PROCEDURE — 99282 EMERGENCY DEPT VISIT SF MDM: CPT | Mod: ,,, | Performed by: EMERGENCY MEDICINE

## 2021-05-14 ENCOUNTER — TELEPHONE (OUTPATIENT)
Dept: EMERGENCY MEDICINE | Facility: HOSPITAL | Age: 41
End: 2021-05-14

## 2021-05-19 RX ORDER — GABAPENTIN 400 MG/1
400 CAPSULE ORAL 3 TIMES DAILY
Qty: 90 CAPSULE | Refills: 0 | Status: SHIPPED | OUTPATIENT
Start: 2021-05-19 | End: 2021-07-14 | Stop reason: SDUPTHER

## 2021-06-03 ENCOUNTER — OFFICE VISIT (OUTPATIENT)
Dept: FAMILY MEDICINE | Facility: HOSPITAL | Age: 41
End: 2021-06-03
Payer: MEDICAID

## 2021-06-03 VITALS
HEART RATE: 92 BPM | WEIGHT: 147.69 LBS | DIASTOLIC BLOOD PRESSURE: 87 MMHG | BODY MASS INDEX: 23.18 KG/M2 | SYSTOLIC BLOOD PRESSURE: 130 MMHG | OXYGEN SATURATION: 87 % | HEIGHT: 67 IN

## 2021-06-03 DIAGNOSIS — J45.901 ASTHMA WITH ACUTE EXACERBATION, UNSPECIFIED ASTHMA SEVERITY, UNSPECIFIED WHETHER PERSISTENT: Primary | ICD-10-CM

## 2021-06-03 PROCEDURE — 99213 OFFICE O/P EST LOW 20 MIN: CPT | Mod: 25 | Performed by: STUDENT IN AN ORGANIZED HEALTH CARE EDUCATION/TRAINING PROGRAM

## 2021-06-03 PROCEDURE — 96372 THER/PROPH/DIAG INJ SC/IM: CPT

## 2021-06-03 RX ORDER — IPRATROPIUM BROMIDE AND ALBUTEROL 20; 100 UG/1; UG/1
SPRAY, METERED RESPIRATORY (INHALATION)
Qty: 4 G | Refills: 1 | Status: SHIPPED | OUTPATIENT
Start: 2021-06-03 | End: 2021-07-06

## 2021-06-03 RX ORDER — PREDNISONE 20 MG/1
40 TABLET ORAL DAILY
Qty: 14 TABLET | Refills: 0 | Status: SHIPPED | OUTPATIENT
Start: 2021-06-04 | End: 2021-06-11

## 2021-06-03 RX ORDER — IPRATROPIUM BROMIDE AND ALBUTEROL SULFATE 2.5; .5 MG/3ML; MG/3ML
3 SOLUTION RESPIRATORY (INHALATION)
Status: SHIPPED | OUTPATIENT
Start: 2021-06-03

## 2021-06-03 RX ORDER — METHYLPREDNISOLONE SODIUM SUCCINATE 125 MG/2ML
125 INJECTION INTRAMUSCULAR; INTRAVENOUS
Status: COMPLETED | OUTPATIENT
Start: 2021-06-03 | End: 2021-06-03

## 2021-06-03 RX ORDER — METHYLPREDNISOLONE SODIUM SUCCINATE 125 MG/2ML
125 INJECTION INTRAMUSCULAR; INTRAVENOUS
Status: DISCONTINUED | OUTPATIENT
Start: 2021-06-03 | End: 2021-06-03

## 2021-06-03 RX ADMIN — METHYLPREDNISOLONE SODIUM SUCCINATE 125 MG: 125 INJECTION, POWDER, FOR SOLUTION INTRAMUSCULAR; INTRAVENOUS at 04:06

## 2021-06-07 DIAGNOSIS — J45.901 ASTHMA WITH ACUTE EXACERBATION, UNSPECIFIED ASTHMA SEVERITY, UNSPECIFIED WHETHER PERSISTENT: ICD-10-CM

## 2021-06-07 RX ORDER — PREDNISONE 20 MG/1
40 TABLET ORAL DAILY
Qty: 14 TABLET | Refills: 0 | Status: CANCELLED | OUTPATIENT
Start: 2021-06-07 | End: 2021-06-14

## 2021-06-15 ENCOUNTER — TELEPHONE (OUTPATIENT)
Dept: FAMILY MEDICINE | Facility: HOSPITAL | Age: 41
End: 2021-06-15

## 2021-06-28 ENCOUNTER — TELEPHONE (OUTPATIENT)
Dept: FAMILY MEDICINE | Facility: HOSPITAL | Age: 41
End: 2021-06-28

## 2021-07-14 ENCOUNTER — NURSE TRIAGE (OUTPATIENT)
Dept: ADMINISTRATIVE | Facility: CLINIC | Age: 41
End: 2021-07-14

## 2021-07-14 RX ORDER — GABAPENTIN 400 MG/1
400 CAPSULE ORAL 3 TIMES DAILY
Qty: 90 CAPSULE | Refills: 11 | Status: SHIPPED | OUTPATIENT
Start: 2021-07-14 | End: 2022-03-11 | Stop reason: SDUPTHER

## 2022-01-20 RX ORDER — ESCITALOPRAM OXALATE 20 MG/1
20 TABLET ORAL NIGHTLY
Qty: 30 TABLET | Refills: 0 | Status: SHIPPED | OUTPATIENT
Start: 2022-01-20 | End: 2022-03-14 | Stop reason: SDUPTHER

## 2022-03-11 RX ORDER — GABAPENTIN 400 MG/1
400 CAPSULE ORAL 3 TIMES DAILY
Qty: 90 CAPSULE | Refills: 0 | Status: SHIPPED | OUTPATIENT
Start: 2022-03-11 | End: 2022-06-14 | Stop reason: SDUPTHER

## 2022-03-14 RX ORDER — ESCITALOPRAM OXALATE 20 MG/1
20 TABLET ORAL NIGHTLY
Qty: 30 TABLET | Refills: 0 | Status: SHIPPED | OUTPATIENT
Start: 2022-03-14 | End: 2022-04-27 | Stop reason: SDUPTHER

## 2022-04-27 RX ORDER — ESCITALOPRAM OXALATE 20 MG/1
20 TABLET ORAL NIGHTLY
Qty: 30 TABLET | Refills: 0 | Status: SHIPPED | OUTPATIENT
Start: 2022-04-27 | End: 2022-06-06 | Stop reason: SDUPTHER

## 2022-06-08 RX ORDER — ESCITALOPRAM OXALATE 20 MG/1
20 TABLET ORAL NIGHTLY
Qty: 30 TABLET | Refills: 0 | Status: SHIPPED | OUTPATIENT
Start: 2022-06-08 | End: 2022-08-24 | Stop reason: SDUPTHER

## 2022-06-17 RX ORDER — GABAPENTIN 400 MG/1
400 CAPSULE ORAL 3 TIMES DAILY
Qty: 90 CAPSULE | Refills: 0 | Status: SHIPPED | OUTPATIENT
Start: 2022-06-17 | End: 2022-07-17

## 2022-08-24 RX ORDER — ESCITALOPRAM OXALATE 20 MG/1
20 TABLET ORAL NIGHTLY
Qty: 30 TABLET | Refills: 0 | Status: SHIPPED | OUTPATIENT
Start: 2022-08-24 | End: 2022-09-23